# Patient Record
Sex: FEMALE | Race: BLACK OR AFRICAN AMERICAN | Employment: FULL TIME | URBAN - METROPOLITAN AREA
[De-identification: names, ages, dates, MRNs, and addresses within clinical notes are randomized per-mention and may not be internally consistent; named-entity substitution may affect disease eponyms.]

---

## 2018-09-11 ENCOUNTER — OFFICE VISIT (OUTPATIENT)
Dept: INTERNAL MEDICINE CLINIC | Facility: CLINIC | Age: 28
End: 2018-09-11
Payer: COMMERCIAL

## 2018-09-11 ENCOUNTER — APPOINTMENT (OUTPATIENT)
Dept: LAB | Facility: HOSPITAL | Age: 28
End: 2018-09-11
Attending: INTERNAL MEDICINE
Payer: COMMERCIAL

## 2018-09-11 VITALS
BODY MASS INDEX: 39.2 KG/M2 | OXYGEN SATURATION: 100 % | DIASTOLIC BLOOD PRESSURE: 84 MMHG | SYSTOLIC BLOOD PRESSURE: 110 MMHG | RESPIRATION RATE: 17 BRPM | TEMPERATURE: 99 F | HEART RATE: 81 BPM | HEIGHT: 61 IN | WEIGHT: 207.63 LBS

## 2018-09-11 DIAGNOSIS — IMO0001 CLASS 2 OBESITY WITH SERIOUS COMORBIDITY AND BODY MASS INDEX (BMI) OF 39.0 TO 39.9 IN ADULT, UNSPECIFIED OBESITY TYPE: ICD-10-CM

## 2018-09-11 DIAGNOSIS — Z51.81 THERAPEUTIC DRUG MONITORING: ICD-10-CM

## 2018-09-11 DIAGNOSIS — E61.1 IRON DEFICIENCY: ICD-10-CM

## 2018-09-11 DIAGNOSIS — E06.3 HASHIMOTO'S THYROIDITIS: Primary | ICD-10-CM

## 2018-09-11 PROCEDURE — 93010 ELECTROCARDIOGRAM REPORT: CPT | Performed by: INTERNAL MEDICINE

## 2018-09-11 PROCEDURE — 93005 ELECTROCARDIOGRAM TRACING: CPT

## 2018-09-11 PROCEDURE — 99204 OFFICE O/P NEW MOD 45 MIN: CPT | Performed by: INTERNAL MEDICINE

## 2018-09-11 NOTE — PATIENT INSTRUCTIONS
ILYA    Welcome to [facility]. We are excited that you are committed to improving your health and have invited our practice to be part of your journey.  Our approach to the medical management of weight loss is similar to that of other c of water per day, add fiber ( benefiber) to the water to increase fullness, overcome constipation    · Eat slowly    · Do not drink your calories ( no regular pop, juice, high calorie coffee drinks, limit alcohol) Also stay away from artificially sweetened

## 2018-09-11 NOTE — PROGRESS NOTES
Mumtaz Trinidad is a 25year old female. Chief complaint:  weight loss management and obesity related comorbidities    HPI:     Mumtaz Trinidad is a 25year old female new to our office today.     with PMH as listed below here for weight management   has 325 (106 FE) MG OR TABS 3 TAB DAILY Disp:  Rfl:      Past Medical History:   Diagnosis Date   • Anemia    • Asthma    • Hypothyroidism      Past Surgical History:  2012: COLONOSCOPY      Comment:  wnl  Social History:  Social History    Tobacco Use      Sm COMPLETE    (Z51.81) Therapeutic drug monitoring  Plan: EKG 12-LEAD, CANCELED: ELECTROCARDIOGRAM,         COMPLETE    (E61.1) Iron deficiency    Reviewed  Readiness for Lifestyle change: 10/10, Interest in Medication: 10   /10, Surgery interest: 5/10.     C

## 2018-09-12 RX ORDER — PHENTERMINE HYDROCHLORIDE 15 MG/1
15 CAPSULE ORAL EVERY MORNING
Qty: 30 CAPSULE | Refills: 0 | Status: SHIPPED | OUTPATIENT
Start: 2018-09-12 | End: 2020-01-07 | Stop reason: CLARIF

## 2018-09-13 ENCOUNTER — TELEPHONE (OUTPATIENT)
Dept: INTERNAL MEDICINE CLINIC | Facility: CLINIC | Age: 28
End: 2018-09-13

## 2018-10-15 ENCOUNTER — OFFICE VISIT (OUTPATIENT)
Dept: INTERNAL MEDICINE CLINIC | Facility: CLINIC | Age: 28
End: 2018-10-15
Payer: COMMERCIAL

## 2018-10-15 VITALS
WEIGHT: 204.81 LBS | TEMPERATURE: 98 F | OXYGEN SATURATION: 99 % | BODY MASS INDEX: 38.67 KG/M2 | DIASTOLIC BLOOD PRESSURE: 78 MMHG | HEART RATE: 96 BPM | HEIGHT: 61 IN | SYSTOLIC BLOOD PRESSURE: 108 MMHG | RESPIRATION RATE: 17 BRPM

## 2018-10-15 DIAGNOSIS — D64.9 ANEMIA, UNSPECIFIED TYPE: ICD-10-CM

## 2018-10-15 DIAGNOSIS — E03.9 HYPOTHYROIDISM, UNSPECIFIED TYPE: Primary | ICD-10-CM

## 2018-10-15 DIAGNOSIS — E66.9 CLASS 2 OBESITY WITHOUT SERIOUS COMORBIDITY WITH BODY MASS INDEX (BMI) OF 39.0 TO 39.9 IN ADULT, UNSPECIFIED OBESITY TYPE: ICD-10-CM

## 2018-10-15 DIAGNOSIS — Z51.81 THERAPEUTIC DRUG MONITORING: ICD-10-CM

## 2018-10-15 PROBLEM — E66.812 CLASS 2 OBESITY WITHOUT SERIOUS COMORBIDITY WITH BODY MASS INDEX (BMI) OF 39.0 TO 39.9 IN ADULT: Status: ACTIVE | Noted: 2018-10-15

## 2018-10-15 PROCEDURE — 99213 OFFICE O/P EST LOW 20 MIN: CPT | Performed by: INTERNAL MEDICINE

## 2018-10-15 RX ORDER — LEVOTHYROXINE SODIUM 50 UG/1
CAPSULE ORAL
COMMUNITY
Start: 2013-05-01 | End: 2018-10-15

## 2018-10-15 RX ORDER — LEVOTHYROXINE SODIUM 0.05 MG/1
50 TABLET ORAL
COMMUNITY
Start: 2015-03-25 | End: 2018-10-15

## 2018-10-15 RX ORDER — LEVOTHYROXINE SODIUM 0.2 MG/1
TABLET ORAL
Refills: 1 | COMMUNITY
Start: 2018-05-07 | End: 2018-10-15

## 2018-10-15 RX ORDER — LEVOTHYROXINE SODIUM 150 UG/1
CAPSULE ORAL
COMMUNITY
Start: 2013-08-19 | End: 2018-10-15

## 2018-10-15 RX ORDER — LEVOTHYROXINE SODIUM 0.07 MG/1
75 TABLET ORAL
COMMUNITY
Start: 2018-08-13 | End: 2018-10-15

## 2018-10-15 RX ORDER — LEVOTHYROXINE SODIUM 0.2 MG/1
200 TABLET ORAL
COMMUNITY
Start: 2018-08-13 | End: 2018-10-15

## 2018-10-15 RX ORDER — LEVOTHYROXINE SODIUM 300 UG/1
350 TABLET ORAL
COMMUNITY
Start: 2015-03-25 | End: 2018-10-15

## 2018-10-15 RX ORDER — PHENTERMINE HYDROCHLORIDE 15 MG/1
15 CAPSULE ORAL EVERY MORNING
Qty: 30 CAPSULE | Refills: 0 | Status: SHIPPED | OUTPATIENT
Start: 2018-10-15 | End: 2020-01-07

## 2018-10-15 RX ORDER — LEVOTHYROXINE SODIUM 0.07 MG/1
TABLET ORAL
Refills: 1 | COMMUNITY
Start: 2018-05-07 | End: 2018-10-15

## 2018-10-15 NOTE — PROGRESS NOTES
Vera Bell is a 25year old female.     Chief complaint:weight loss follow up , medication refill, anemia and hypothyroidism     HPI:   Baldo Lees here for follow up on weight loss   Wt Readings from Last 12 Encounters:  10/15/18 : 204 lb 12.8 oz  09/11/1 hypothyroidism              REVIEW OF SYSTEMS:   A comprehensive 10 point review of systems was completed.   Pertinent positives and negatives noted in the the HPI          PHYSICAL EXAM:   /78 (BP Location: Right arm, Patient Position: Sitting, Cuff D, 25-HYDROXY,         VITAMIN B12, FERRITIN, HEMOGLOBIN A1C, LIPID         PANEL, TSH W REFLEX TO FREE T4    (Z51.81) Therapeutic drug monitoring  Plan: CBC WITH DIFFERENTIAL WITH PLATELET, COMP         METABOLIC PANEL (14), VITAMIN D, 25-HYDROXY,

## 2018-10-15 NOTE — PATIENT INSTRUCTIONS
Healthy Tips for Eating Out  Choices  It’s not easy to change the habits of a lifetime. Experts say that it can take 6 months just to change one old habit for a healthier one. That’s why gradual change is so important.  These tips are reminders of the dozen the skin from fried chicken and choose mashed potatoes, corn on the cob, and baked beans on the side. · Order a broiled chicken salad and pile on fresh vegetables from the salad bar. Use a small amount of low-fat dressing.   · Top a baked potato with cotta foods made with butter, coconut or palm oil, or hydrogenated fats    © 1218-6239 The [facility] [employer], [facility] [employer]. All rights reserved. This information is not intended as a substitute for professional medical care.  Trevor Cisneros

## 2018-10-27 ENCOUNTER — LAB ENCOUNTER (OUTPATIENT)
Dept: LAB | Age: 28
End: 2018-10-27
Attending: INTERNAL MEDICINE
Payer: COMMERCIAL

## 2018-10-27 DIAGNOSIS — E03.9 HYPOTHYROIDISM, UNSPECIFIED TYPE: ICD-10-CM

## 2018-10-27 DIAGNOSIS — Z51.81 THERAPEUTIC DRUG MONITORING: ICD-10-CM

## 2018-10-27 PROCEDURE — 80061 LIPID PANEL: CPT

## 2018-10-27 PROCEDURE — 80053 COMPREHEN METABOLIC PANEL: CPT

## 2018-10-27 PROCEDURE — 83036 HEMOGLOBIN GLYCOSYLATED A1C: CPT

## 2018-10-27 PROCEDURE — 84443 ASSAY THYROID STIM HORMONE: CPT

## 2018-10-27 PROCEDURE — 36415 COLL VENOUS BLD VENIPUNCTURE: CPT

## 2018-10-27 PROCEDURE — 82306 VITAMIN D 25 HYDROXY: CPT

## 2018-10-27 PROCEDURE — 82607 VITAMIN B-12: CPT

## 2018-10-27 PROCEDURE — 85025 COMPLETE CBC W/AUTO DIFF WBC: CPT

## 2018-10-27 PROCEDURE — 82728 ASSAY OF FERRITIN: CPT

## 2018-10-30 RX ORDER — ERGOCALCIFEROL 1.25 MG/1
50000 CAPSULE ORAL WEEKLY
Qty: 12 CAPSULE | Refills: 1 | Status: SHIPPED | OUTPATIENT
Start: 2018-10-30 | End: 2019-01-16

## 2018-10-30 RX ORDER — LEVOTHYROXINE SODIUM 88 UG/1
88 TABLET ORAL
Qty: 90 TABLET | Refills: 1 | Status: SHIPPED | OUTPATIENT
Start: 2018-10-30 | End: 2019-01-28

## 2018-11-02 ENCOUNTER — CHARTING TRANS (OUTPATIENT)
Dept: OTHER | Age: 28
End: 2018-11-02

## 2018-12-10 ENCOUNTER — OFFICE VISIT (OUTPATIENT)
Dept: INTERNAL MEDICINE CLINIC | Facility: CLINIC | Age: 28
End: 2018-12-10
Payer: COMMERCIAL

## 2018-12-10 VITALS
WEIGHT: 205.38 LBS | TEMPERATURE: 98 F | HEART RATE: 86 BPM | OXYGEN SATURATION: 98 % | RESPIRATION RATE: 17 BRPM | HEIGHT: 61 IN | BODY MASS INDEX: 38.78 KG/M2 | DIASTOLIC BLOOD PRESSURE: 70 MMHG | SYSTOLIC BLOOD PRESSURE: 122 MMHG

## 2018-12-10 DIAGNOSIS — E78.5 HYPERLIPIDEMIA, UNSPECIFIED HYPERLIPIDEMIA TYPE: ICD-10-CM

## 2018-12-10 DIAGNOSIS — E66.9 CLASS 2 OBESITY WITHOUT SERIOUS COMORBIDITY WITH BODY MASS INDEX (BMI) OF 39.0 TO 39.9 IN ADULT, UNSPECIFIED OBESITY TYPE: ICD-10-CM

## 2018-12-10 DIAGNOSIS — E03.9 HYPOTHYROIDISM, UNSPECIFIED TYPE: ICD-10-CM

## 2018-12-10 DIAGNOSIS — Z51.81 THERAPEUTIC DRUG MONITORING: Primary | ICD-10-CM

## 2018-12-10 DIAGNOSIS — E55.9 VITAMIN D DEFICIENCY: ICD-10-CM

## 2018-12-10 PROCEDURE — 99214 OFFICE O/P EST MOD 30 MIN: CPT | Performed by: INTERNAL MEDICINE

## 2018-12-10 RX ORDER — PHENTERMINE HYDROCHLORIDE 37.5 MG/1
37.5 TABLET ORAL
Qty: 30 TABLET | Refills: 0 | Status: SHIPPED | OUTPATIENT
Start: 2018-12-10 | End: 2019-01-09

## 2018-12-10 RX ORDER — TOPIRAMATE 25 MG/1
25 TABLET ORAL 2 TIMES DAILY
Qty: 60 TABLET | Refills: 0 | Status: SHIPPED | OUTPATIENT
Start: 2018-12-10 | End: 2019-02-08

## 2018-12-10 NOTE — PROGRESS NOTES
Sandy William is a 25year old female.     Chief complaint:weight loss follow up , medication refill,     HPI:   ILYA here for follow up on weight loss   Wt Readings from Last 12 Encounters:  12/10/18 : 205 lb 6.4 oz  10/15/18 : 204 lb 12.8 oz  09/11/1 SOLR 50 MCG DAILY Disp:  Rfl:    FERROUS FUMARATE 325 (106 FE) MG OR TABS 3 TAB DAILY Disp:  Rfl:       Past Medical History:   Diagnosis Date   • Anemia    • Asthma    • Hypothyroidism      Past Surgical History:   Procedure Laterality Date   • COLONOSCOP obesity type    (E03.9) Hypothyroidism, unspecified type    (E55.9) Vitamin D deficiency    Plan:  · Patient has gained 1 lbs # since LOV 2 month ago. Patient has lost a total weight loss of  2 lbs # since first weight loss consult.   Labs reviewed  · Anita Gentile

## 2018-12-28 ENCOUNTER — HOSPITAL ENCOUNTER (OUTPATIENT)
Age: 28
Discharge: HOME | End: 2018-12-28
Attending: FAMILY MEDICINE
Payer: COMMERCIAL

## 2018-12-28 VITALS
HEART RATE: 108 BPM | DIASTOLIC BLOOD PRESSURE: 78 MMHG | RESPIRATION RATE: 22 BRPM | SYSTOLIC BLOOD PRESSURE: 115 MMHG | TEMPERATURE: 98 F | OXYGEN SATURATION: 100 %

## 2018-12-28 DIAGNOSIS — J45.21 MILD INTERMITTENT ASTHMA WITH EXACERBATION: Primary | ICD-10-CM

## 2018-12-28 DIAGNOSIS — R06.02 SHORTNESS OF BREATH: ICD-10-CM

## 2018-12-28 PROCEDURE — 94640 AIRWAY INHALATION TREATMENT: CPT

## 2018-12-28 PROCEDURE — 99204 OFFICE O/P NEW MOD 45 MIN: CPT

## 2018-12-28 PROCEDURE — 99214 OFFICE O/P EST MOD 30 MIN: CPT

## 2018-12-28 RX ORDER — IPRATROPIUM BROMIDE AND ALBUTEROL SULFATE 2.5; .5 MG/3ML; MG/3ML
3 SOLUTION RESPIRATORY (INHALATION) ONCE
Status: COMPLETED | OUTPATIENT
Start: 2018-12-28 | End: 2018-12-28

## 2018-12-28 RX ORDER — PREDNISONE 20 MG/1
TABLET ORAL
Qty: 8 TABLET | Refills: 0 | Status: SHIPPED | OUTPATIENT
Start: 2018-12-28 | End: 2020-01-07

## 2018-12-28 RX ORDER — ALBUTEROL SULFATE 2.5 MG/3ML
2.5 SOLUTION RESPIRATORY (INHALATION) EVERY 4 HOURS PRN
Qty: 1 BOX | Refills: 0 | Status: SHIPPED | OUTPATIENT
Start: 2018-12-28

## 2018-12-28 RX ORDER — PREDNISONE 20 MG/1
40 TABLET ORAL ONCE
Status: COMPLETED | OUTPATIENT
Start: 2018-12-28 | End: 2018-12-28

## 2018-12-28 NOTE — ED PROVIDER NOTES
Patient Seen in: [facility]    History   Patient presents with:  Cough/URI    Stated Complaint: difficulty breathing,asthma    HPI    This 24-year-old female with a known history for asthma presents the office with complaint of Sclera anicteric,  conjunctiva normal.  EARS: Tympanic membranes normal, EAC's normal.  NOSE: Turbinates congested, no bleeding noted. PHARYNX:  Cobblestoning with post nasal drip noted, airway patent, uvula midline  NECK:  No cervical lymphadenopathy.  No once daily with breakfast 4 days. Start on 12/29/18. Qty: 8 tablet Refills: 0    albuterol sulfate (2.5 MG/3ML) 0.083% Inhalation Nebu Soln  Take 3 mL (2.5 mg total) by nebulization every 4 (four) hours as needed for Wheezing or Shortness of Breath.   Qty:

## 2019-01-07 ENCOUNTER — OFFICE VISIT (OUTPATIENT)
Dept: INTERNAL MEDICINE CLINIC | Facility: CLINIC | Age: 29
End: 2019-01-07
Payer: COMMERCIAL

## 2019-01-07 VITALS
BODY MASS INDEX: 37.42 KG/M2 | HEART RATE: 91 BPM | DIASTOLIC BLOOD PRESSURE: 80 MMHG | RESPIRATION RATE: 17 BRPM | OXYGEN SATURATION: 98 % | WEIGHT: 198.19 LBS | SYSTOLIC BLOOD PRESSURE: 122 MMHG | TEMPERATURE: 99 F | HEIGHT: 61 IN

## 2019-01-07 DIAGNOSIS — Z51.81 THERAPEUTIC DRUG MONITORING: Primary | ICD-10-CM

## 2019-01-07 DIAGNOSIS — E66.9 CLASS 2 OBESITY WITHOUT SERIOUS COMORBIDITY WITH BODY MASS INDEX (BMI) OF 39.0 TO 39.9 IN ADULT, UNSPECIFIED OBESITY TYPE: ICD-10-CM

## 2019-01-07 DIAGNOSIS — E03.9 HYPOTHYROIDISM, UNSPECIFIED TYPE: ICD-10-CM

## 2019-01-07 DIAGNOSIS — Z76.0 MEDICATION REFILL: ICD-10-CM

## 2019-01-07 PROCEDURE — 99213 OFFICE O/P EST LOW 20 MIN: CPT | Performed by: INTERNAL MEDICINE

## 2019-01-07 RX ORDER — TOPIRAMATE 25 MG/1
25 TABLET ORAL 2 TIMES DAILY
Qty: 60 TABLET | Refills: 1 | Status: SHIPPED | OUTPATIENT
Start: 2019-01-07 | End: 2019-02-06

## 2019-01-07 RX ORDER — PHENTERMINE HYDROCHLORIDE 37.5 MG/1
37.5 TABLET ORAL
Qty: 30 TABLET | Refills: 0 | Status: SHIPPED | OUTPATIENT
Start: 2019-01-07 | End: 2019-02-06

## 2019-01-07 RX ORDER — PHENTERMINE HYDROCHLORIDE 37.5 MG/1
37.5 TABLET ORAL
Qty: 45 TABLET | Refills: 0 | Status: SHIPPED | OUTPATIENT
Start: 2019-01-07 | End: 2019-02-20

## 2019-01-07 NOTE — PROGRESS NOTES
Libia Milton is a 25year old female.     Chief complaint:weight loss follow up , medication refill    HPI:   Vikas Angela here for follow up on weight loss   Wt Readings from Last 12 Encounters:  01/07/19 : 198 lb 3.2 oz  12/10/18 : 205 lb 6.4 oz  10/15/18 : mouth once a week for 12 doses.  Disp: 12 capsule Rfl: 1   Levothyroxine Sodium 88 MCG Oral Tab Take 1 tablet (88 mcg total) by mouth before breakfast. Disp: 90 tablet Rfl: 1   Phentermine HCl 15 MG Oral Cap Take 1 capsule (15 mg total) by mouth every morni nourished,in no apparent distress  SKIN: no rashes,no suspicious lesions  HEENT: atraumatic, normocephalic,ears and throat are clear  NECK: supple,no adenopathy  LUNGS: clear to auscultation  CARDIO: RRR without murmur  GI: good BS's,no masses, HSM or tend

## 2019-01-07 NOTE — PATIENT INSTRUCTIONS
A Sample Walking Program  Experts recommend walking briskly on most days. Aim for a target of 30 minutes on most days, or 150 or more minutes a week.  Walking programs can help you reach this goal by slowly increasing the frequency and the amount of  time y

## 2019-02-20 DIAGNOSIS — Z51.81 THERAPEUTIC DRUG MONITORING: ICD-10-CM

## 2019-02-20 DIAGNOSIS — Z76.0 MEDICATION REFILL: ICD-10-CM

## 2019-02-20 DIAGNOSIS — E66.9 CLASS 2 OBESITY WITHOUT SERIOUS COMORBIDITY WITH BODY MASS INDEX (BMI) OF 39.0 TO 39.9 IN ADULT, UNSPECIFIED OBESITY TYPE: ICD-10-CM

## 2019-02-20 DIAGNOSIS — E03.9 HYPOTHYROIDISM, UNSPECIFIED TYPE: ICD-10-CM

## 2019-02-20 RX ORDER — PHENTERMINE HYDROCHLORIDE 37.5 MG/1
37.5 TABLET ORAL
Qty: 45 TABLET | Refills: 0 | Status: SHIPPED | OUTPATIENT
Start: 2019-02-20 | End: 2019-04-06

## 2019-02-25 ENCOUNTER — OFFICE VISIT (OUTPATIENT)
Dept: INTERNAL MEDICINE CLINIC | Facility: CLINIC | Age: 29
End: 2019-02-25
Payer: COMMERCIAL

## 2019-02-25 VITALS
HEIGHT: 61 IN | WEIGHT: 196.19 LBS | RESPIRATION RATE: 18 BRPM | DIASTOLIC BLOOD PRESSURE: 64 MMHG | BODY MASS INDEX: 37.04 KG/M2 | HEART RATE: 88 BPM | SYSTOLIC BLOOD PRESSURE: 118 MMHG | OXYGEN SATURATION: 99 % | TEMPERATURE: 97 F

## 2019-02-25 DIAGNOSIS — Z51.81 THERAPEUTIC DRUG MONITORING: ICD-10-CM

## 2019-02-25 DIAGNOSIS — E03.9 HYPOTHYROIDISM, UNSPECIFIED TYPE: ICD-10-CM

## 2019-02-25 DIAGNOSIS — E78.5 HYPERLIPIDEMIA, UNSPECIFIED HYPERLIPIDEMIA TYPE: Primary | ICD-10-CM

## 2019-02-25 DIAGNOSIS — E66.9 CLASS 2 OBESITY WITHOUT SERIOUS COMORBIDITY WITH BODY MASS INDEX (BMI) OF 39.0 TO 39.9 IN ADULT, UNSPECIFIED OBESITY TYPE: ICD-10-CM

## 2019-02-25 PROCEDURE — 99214 OFFICE O/P EST MOD 30 MIN: CPT | Performed by: INTERNAL MEDICINE

## 2019-02-25 RX ORDER — PHENTERMINE HYDROCHLORIDE 37.5 MG/1
37.5 TABLET ORAL
Qty: 30 TABLET | Refills: 0 | Status: SHIPPED | OUTPATIENT
Start: 2019-02-25 | End: 2019-03-27

## 2019-02-25 RX ORDER — TOPIRAMATE 50 MG/1
50 TABLET, FILM COATED ORAL 2 TIMES DAILY
Qty: 180 TABLET | Refills: 0 | Status: SHIPPED | OUTPATIENT
Start: 2019-02-25 | End: 2019-05-26

## 2019-02-25 NOTE — PATIENT INSTRUCTIONS
Lorcaserin extended-release tablets  Brand Name: Belviq XR  What is this medicine? LORCASERIN (jennifer ca SER in) is used to promote and maintain weight loss in obese patients.  This medicine should be used with a reduced calorie diet and, if appropriate, an Side effects that usually do not require medical attention (report to your doctor or health care professional if they continue or are bothersome):  · back pain  · constipation  · cough  · dry mouth  · nausea  · tiredness  What may interact with this medici · pregnant or trying to get pregnant  · breast-feeding  What should I watch for while using this medicine? This medicine is intended to be used in addition to a healthy diet and appropriate exercise. The best results are achieved this way.  Your doctor medardo

## 2019-03-29 ENCOUNTER — DOCUMENTATION ONLY (OUTPATIENT)
Dept: INTERNAL MEDICINE CLINIC | Facility: CLINIC | Age: 29
End: 2019-03-29

## 2020-01-07 ENCOUNTER — HOSPITAL ENCOUNTER (OUTPATIENT)
Facility: HOSPITAL | Age: 30
Setting detail: OBSERVATION
Discharge: HOME | End: 2020-01-09
Attending: EMERGENCY MEDICINE | Admitting: HOSPITALIST
Payer: COMMERCIAL

## 2020-01-07 ENCOUNTER — APPOINTMENT (OUTPATIENT)
Dept: ULTRASOUND IMAGING | Age: 30
End: 2020-01-07
Attending: EMERGENCY MEDICINE
Payer: COMMERCIAL

## 2020-01-07 ENCOUNTER — HOSPITAL ENCOUNTER (OUTPATIENT)
Age: 30
Discharge: ANOTHER HOSPITAL | End: 2020-01-07
Attending: EMERGENCY MEDICINE
Payer: COMMERCIAL

## 2020-01-07 VITALS
HEART RATE: 89 BPM | OXYGEN SATURATION: 100 % | RESPIRATION RATE: 18 BRPM | TEMPERATURE: 99 F | DIASTOLIC BLOOD PRESSURE: 85 MMHG | WEIGHT: 190 LBS | HEIGHT: 61 IN | BODY MASS INDEX: 35.87 KG/M2 | SYSTOLIC BLOOD PRESSURE: 123 MMHG

## 2020-01-07 DIAGNOSIS — K80.20 GALLSTONES: Primary | ICD-10-CM

## 2020-01-07 DIAGNOSIS — K80.50 BILIARY COLIC: ICD-10-CM

## 2020-01-07 DIAGNOSIS — K80.50 GALL STONES, COMMON BILE DUCT: ICD-10-CM

## 2020-01-07 DIAGNOSIS — K80.20 CHOLELITHIASES: ICD-10-CM

## 2020-01-07 PROBLEM — E66.9 CLASS 2 OBESITY WITHOUT SERIOUS COMORBIDITY WITH BODY MASS INDEX (BMI) OF 35.0 TO 35.9 IN ADULT: Status: ACTIVE | Noted: 2018-10-15

## 2020-01-07 PROBLEM — E66.812 CLASS 2 OBESITY WITHOUT SERIOUS COMORBIDITY WITH BODY MASS INDEX (BMI) OF 35.0 TO 35.9 IN ADULT: Status: ACTIVE | Noted: 2018-10-15

## 2020-01-07 LAB
#MXD IC: 0.5 X10ˆ3/UL (ref 0.1–1)
ALBUMIN SERPL-MCNC: 3.6 G/DL (ref 3.4–5)
ALBUMIN/GLOB SERPL: 0.7 {RATIO} (ref 1–2)
ALP LIVER SERPL-CCNC: 201 U/L (ref 37–98)
ALT SERPL-CCNC: 570 U/L (ref 13–56)
ANION GAP SERPL CALC-SCNC: 6 MMOL/L (ref 0–18)
AST SERPL-CCNC: 366 U/L (ref 15–37)
BASOPHILS # BLD AUTO: 0.08 X10(3) UL (ref 0–0.2)
BASOPHILS NFR BLD AUTO: 1.2 %
BILIRUB SERPL-MCNC: 3.3 MG/DL (ref 0.1–2)
BUN BLD-MCNC: 7 MG/DL (ref 7–18)
BUN/CREAT SERPL: 5.3 (ref 10–20)
CALCIUM BLD-MCNC: 9.6 MG/DL (ref 8.5–10.1)
CHLORIDE SERPL-SCNC: 107 MMOL/L (ref 98–112)
CLARITY UR REFRACT.AUTO: CLEAR
CO2 SERPL-SCNC: 23 MMOL/L (ref 21–32)
CREAT BLD-MCNC: 1.2 MG/DL (ref 0.55–1.02)
CREAT BLD-MCNC: 1.31 MG/DL (ref 0.55–1.02)
DEPRECATED RDW RBC AUTO: 40.9 FL (ref 35.1–46.3)
EOSINOPHIL # BLD AUTO: 0.02 X10(3) UL (ref 0–0.7)
EOSINOPHIL NFR BLD AUTO: 0.3 %
ERYTHROCYTE [DISTWIDTH] IN BLOOD BY AUTOMATED COUNT: 13.8 % (ref 11–15)
GLOBULIN PLAS-MCNC: 5.4 G/DL (ref 2.8–4.4)
GLUCOSE BLD-MCNC: 103 MG/DL (ref 70–99)
GLUCOSE BLD-MCNC: 87 MG/DL (ref 70–99)
GLUCOSE UR STRIP.AUTO-MCNC: NEGATIVE MG/DL
HCT VFR BLD AUTO: 39.7 % (ref 35–48)
HCT VFR BLD AUTO: 40 % (ref 35–48)
HGB BLD-MCNC: 12.4 G/DL (ref 12–16)
HGB BLD-MCNC: 12.6 G/DL (ref 12–16)
IMM GRANULOCYTES # BLD AUTO: 0.02 X10(3) UL (ref 0–1)
IMM GRANULOCYTES NFR BLD: 0.3 %
ISTAT BUN: 7 MG/DL (ref 8–20)
ISTAT CHLORIDE: 104 MMOL/L (ref 101–111)
ISTAT HEMATOCRIT: 43 % (ref 34–50)
ISTAT IONIZED CALCIUM FOR CHEM 8: 1.22 MMOL/L (ref 1.12–1.32)
ISTAT POTASSIUM: 4.9 MMOL/L (ref 3.6–5.1)
ISTAT SODIUM: 137 MMOL/L (ref 136–145)
KETONES UR STRIP.AUTO-MCNC: NEGATIVE MG/DL
LEUKOCYTE ESTERASE UR QL STRIP.AUTO: NEGATIVE
LIPASE SERPL-CCNC: 128 U/L (ref 73–393)
LYMPHOCYTES # BLD AUTO: 1.4 X10ˆ3/UL (ref 1–4)
LYMPHOCYTES # BLD AUTO: 1.92 X10(3) UL (ref 1–4)
LYMPHOCYTES NFR BLD AUTO: 21.2 %
LYMPHOCYTES NFR BLD AUTO: 28.4 %
M PROTEIN MFR SERPL ELPH: 9 G/DL (ref 6.4–8.2)
MCH RBC QN AUTO: 25.5 PG (ref 26–34)
MCH RBC QN AUTO: 25.9 PG (ref 26–34)
MCHC RBC AUTO-ENTMCNC: 31.2 G/DL (ref 31–37)
MCHC RBC AUTO-ENTMCNC: 31.5 G/DL (ref 31–37)
MCV RBC AUTO: 81.5 FL (ref 80–100)
MCV RBC AUTO: 82.3 FL (ref 80–100)
MIXED CELL %: 7.8 %
MONOCYTES # BLD AUTO: 0.55 X10(3) UL (ref 0.1–1)
MONOCYTES NFR BLD AUTO: 8.1 %
NEUTROPHILS # BLD AUTO: 4.17 X10 (3) UL (ref 1.5–7.7)
NEUTROPHILS # BLD AUTO: 4.17 X10(3) UL (ref 1.5–7.7)
NEUTROPHILS # BLD AUTO: 4.9 X10ˆ3/UL (ref 1.5–7.7)
NEUTROPHILS NFR BLD AUTO: 61.7 %
NEUTROPHILS NFR BLD AUTO: 71 %
NITRITE UR QL STRIP.AUTO: NEGATIVE
OSMOLALITY SERPL CALC.SUM OF ELEC: 279 MOSM/KG (ref 275–295)
PH UR STRIP.AUTO: 5 [PH] (ref 4.5–8)
PLATELET # BLD AUTO: 361 10(3)UL (ref 150–450)
PLATELET # BLD AUTO: 377 X10ˆ3/UL (ref 150–450)
POCT GLUCOSE URINE: NEGATIVE MG/DL
POCT LEUKOCYTE ESTERASE URINE: NEGATIVE
POCT NITRITE URINE: NEGATIVE
POCT PH URINE: 5 (ref 5–8)
POCT PROTEIN URINE: NEGATIVE MG/DL
POCT SPECIFIC GRAVITY URINE: 1.03
POCT URINE CLARITY: CLEAR
POCT URINE PREGNANCY: NEGATIVE
POCT URINE PREGNANCY: NEGATIVE
POCT UROBILINOGEN URINE: 1 MG/DL
POTASSIUM SERPL-SCNC: 4.7 MMOL/L (ref 3.5–5.1)
PROT UR STRIP.AUTO-MCNC: NEGATIVE MG/DL
RBC # BLD AUTO: 4.86 X10ˆ6/UL (ref 3.8–5.3)
RBC # BLD AUTO: 4.87 X10(6)UL (ref 3.8–5.3)
RBC UR QL AUTO: NEGATIVE
SODIUM SERPL-SCNC: 136 MMOL/L (ref 136–145)
SP GR UR STRIP.AUTO: 1.02 (ref 1–1.03)
UROBILINOGEN UR STRIP.AUTO-MCNC: 4 MG/DL
WBC # BLD AUTO: 6.8 X10(3) UL (ref 4–11)
WBC # BLD AUTO: 6.8 X10ˆ3/UL (ref 4–11)

## 2020-01-07 PROCEDURE — 99220 INITIAL OBSERVATION CARE,LEVL III: CPT | Performed by: HOSPITALIST

## 2020-01-07 PROCEDURE — 81025 URINE PREGNANCY TEST: CPT | Performed by: EMERGENCY MEDICINE

## 2020-01-07 PROCEDURE — 36415 COLL VENOUS BLD VENIPUNCTURE: CPT

## 2020-01-07 PROCEDURE — 76700 US EXAM ABDOM COMPLETE: CPT | Performed by: EMERGENCY MEDICINE

## 2020-01-07 PROCEDURE — 81002 URINALYSIS NONAUTO W/O SCOPE: CPT | Performed by: EMERGENCY MEDICINE

## 2020-01-07 PROCEDURE — 99214 OFFICE O/P EST MOD 30 MIN: CPT

## 2020-01-07 PROCEDURE — 85025 COMPLETE CBC W/AUTO DIFF WBC: CPT | Performed by: EMERGENCY MEDICINE

## 2020-01-07 PROCEDURE — 80047 BASIC METABLC PNL IONIZED CA: CPT

## 2020-01-07 RX ORDER — HYDROMORPHONE HYDROCHLORIDE 1 MG/ML
0.5 INJECTION, SOLUTION INTRAMUSCULAR; INTRAVENOUS; SUBCUTANEOUS EVERY 30 MIN PRN
Status: DISCONTINUED | OUTPATIENT
Start: 2020-01-07 | End: 2020-01-07

## 2020-01-07 RX ORDER — NALTREXONE HYDROCHLORIDE AND BUPROPION HYDROCHLORIDE 8; 90 MG/1; MG/1
1 TABLET, EXTENDED RELEASE ORAL DAILY
COMMUNITY
End: 2022-02-04

## 2020-01-07 RX ORDER — MORPHINE SULFATE 4 MG/ML
2 INJECTION, SOLUTION INTRAMUSCULAR; INTRAVENOUS EVERY 2 HOUR PRN
Status: DISCONTINUED | OUTPATIENT
Start: 2020-01-07 | End: 2020-01-09

## 2020-01-07 RX ORDER — SODIUM CHLORIDE 9 MG/ML
INJECTION, SOLUTION INTRAVENOUS CONTINUOUS
Status: DISCONTINUED | OUTPATIENT
Start: 2020-01-07 | End: 2020-01-09

## 2020-01-07 RX ORDER — ONDANSETRON 2 MG/ML
4 INJECTION INTRAMUSCULAR; INTRAVENOUS EVERY 6 HOURS PRN
Status: DISCONTINUED | OUTPATIENT
Start: 2020-01-07 | End: 2020-01-07

## 2020-01-07 RX ORDER — LEVOTHYROXINE SODIUM 0.05 MG/1
50 TABLET ORAL
COMMUNITY
Start: 2019-12-31 | End: 2022-02-04

## 2020-01-07 RX ORDER — ONDANSETRON 2 MG/ML
4 INJECTION INTRAMUSCULAR; INTRAVENOUS EVERY 4 HOURS PRN
Status: DISCONTINUED | OUTPATIENT
Start: 2020-01-07 | End: 2020-01-07

## 2020-01-07 RX ORDER — ONDANSETRON 2 MG/ML
4 INJECTION INTRAMUSCULAR; INTRAVENOUS EVERY 6 HOURS PRN
Status: DISCONTINUED | OUTPATIENT
Start: 2020-01-07 | End: 2020-01-09

## 2020-01-07 RX ORDER — MELATONIN
325
COMMUNITY
End: 2022-02-04

## 2020-01-07 RX ORDER — MORPHINE SULFATE 4 MG/ML
1 INJECTION, SOLUTION INTRAMUSCULAR; INTRAVENOUS EVERY 2 HOUR PRN
Status: DISCONTINUED | OUTPATIENT
Start: 2020-01-07 | End: 2020-01-09

## 2020-01-07 RX ORDER — LEVOTHYROXINE SODIUM 300 UG/1
300 TABLET ORAL
COMMUNITY
Start: 2019-12-31 | End: 2022-02-04

## 2020-01-07 RX ORDER — MORPHINE SULFATE 4 MG/ML
4 INJECTION, SOLUTION INTRAMUSCULAR; INTRAVENOUS EVERY 2 HOUR PRN
Status: DISCONTINUED | OUTPATIENT
Start: 2020-01-07 | End: 2020-01-09

## 2020-01-07 RX ORDER — METOCLOPRAMIDE HYDROCHLORIDE 5 MG/ML
10 INJECTION INTRAMUSCULAR; INTRAVENOUS EVERY 8 HOURS PRN
Status: DISCONTINUED | OUTPATIENT
Start: 2020-01-07 | End: 2020-01-09

## 2020-01-07 RX ORDER — KETOROLAC TROMETHAMINE 30 MG/ML
15 INJECTION, SOLUTION INTRAMUSCULAR; INTRAVENOUS ONCE
Status: DISCONTINUED | OUTPATIENT
Start: 2020-01-07 | End: 2020-01-07

## 2020-01-07 RX ORDER — SODIUM CHLORIDE 9 MG/ML
INJECTION, SOLUTION INTRAVENOUS CONTINUOUS
Status: DISCONTINUED | OUTPATIENT
Start: 2020-01-07 | End: 2020-01-08

## 2020-01-07 NOTE — ED PROVIDER NOTES
Patient Seen in: [address] [facility]      History   Patient presents with:  Abdominal Pain    Stated Complaint: upper abd pain    HPI    Patient is very pleasant 23-year-old female presents with epigastric pain for last 2 days.   With dianne clear to auscultation bilaterally without rales/rhonchi. Equal breath sounds bilaterally  Cardiac: No tachycardia. No murmurs. Regular rate and rhythm. Abdomen: Mild tenderness in the epigastrium. No lower abdominal tenderness. Negative Connor's.   No [facility]  [phone]    In 2 days          Medications Prescribed:  Discharge Medication List as of 1/7/2020  5:37 PM

## 2020-01-08 ENCOUNTER — APPOINTMENT (OUTPATIENT)
Dept: GENERAL RADIOLOGY | Facility: HOSPITAL | Age: 30
End: 2020-01-08
Attending: SURGERY
Payer: COMMERCIAL

## 2020-01-08 ENCOUNTER — ANESTHESIA (OUTPATIENT)
Dept: SURGERY | Facility: HOSPITAL | Age: 30
End: 2020-01-08
Payer: COMMERCIAL

## 2020-01-08 ENCOUNTER — ANESTHESIA EVENT (OUTPATIENT)
Dept: SURGERY | Facility: HOSPITAL | Age: 30
End: 2020-01-08
Payer: COMMERCIAL

## 2020-01-08 LAB
ALBUMIN SERPL-MCNC: 2.9 G/DL (ref 3.4–5)
ALBUMIN/GLOB SERPL: 0.7 {RATIO} (ref 1–2)
ALP LIVER SERPL-CCNC: 164 U/L (ref 37–98)
ALT SERPL-CCNC: 394 U/L (ref 13–56)
ANION GAP SERPL CALC-SCNC: 5 MMOL/L (ref 0–18)
AST SERPL-CCNC: 180 U/L (ref 15–37)
BILIRUB SERPL-MCNC: 1.5 MG/DL (ref 0.1–2)
BUN BLD-MCNC: 7 MG/DL (ref 7–18)
BUN/CREAT SERPL: 6.2 (ref 10–20)
CALCIUM BLD-MCNC: 8.7 MG/DL (ref 8.5–10.1)
CHLORIDE SERPL-SCNC: 111 MMOL/L (ref 98–112)
CO2 SERPL-SCNC: 23 MMOL/L (ref 21–32)
CREAT BLD-MCNC: 1.13 MG/DL (ref 0.55–1.02)
GLOBULIN PLAS-MCNC: 3.9 G/DL (ref 2.8–4.4)
GLUCOSE BLD-MCNC: 81 MG/DL (ref 70–99)
INR BLD: 1.14 (ref 0.9–1.1)
M PROTEIN MFR SERPL ELPH: 6.8 G/DL (ref 6.4–8.2)
OSMOLALITY SERPL CALC.SUM OF ELEC: 285 MOSM/KG (ref 275–295)
POTASSIUM SERPL-SCNC: 4 MMOL/L (ref 3.5–5.1)
PSA SERPL DL<=0.01 NG/ML-MCNC: 15.1 SECONDS (ref 12.5–14.7)
SODIUM SERPL-SCNC: 139 MMOL/L (ref 136–145)

## 2020-01-08 PROCEDURE — 0FT44ZZ RESECTION OF GALLBLADDER, PERCUTANEOUS ENDOSCOPIC APPROACH: ICD-10-PCS | Performed by: SURGERY

## 2020-01-08 PROCEDURE — BF121ZZ FLUOROSCOPY OF GALLBLADDER USING LOW OSMOLAR CONTRAST: ICD-10-PCS | Performed by: SURGERY

## 2020-01-08 PROCEDURE — 74300 X-RAY BILE DUCTS/PANCREAS: CPT | Performed by: SURGERY

## 2020-01-08 PROCEDURE — 99225 SUBSEQUENT OBSERVATION CARE: CPT | Performed by: HOSPITALIST

## 2020-01-08 PROCEDURE — 47563 LAPARO CHOLECYSTECTOMY/GRAPH: CPT | Performed by: SURGERY

## 2020-01-08 PROCEDURE — 99243 OFF/OP CNSLTJ NEW/EST LOW 30: CPT | Performed by: SURGERY

## 2020-01-08 PROCEDURE — 47563 LAPARO CHOLECYSTECTOMY/GRAPH: CPT | Performed by: PHYSICIAN ASSISTANT

## 2020-01-08 RX ORDER — HEPARIN SODIUM 5000 [USP'U]/ML
5000 INJECTION, SOLUTION INTRAVENOUS; SUBCUTANEOUS ONCE
Status: DISCONTINUED | OUTPATIENT
Start: 2020-01-08 | End: 2020-01-08 | Stop reason: HOSPADM

## 2020-01-08 RX ORDER — HYDROCODONE BITARTRATE AND ACETAMINOPHEN 5; 325 MG/1; MG/1
1 TABLET ORAL EVERY 6 HOURS PRN
Status: DISCONTINUED | OUTPATIENT
Start: 2020-01-08 | End: 2020-01-09

## 2020-01-08 RX ORDER — SODIUM CHLORIDE, SODIUM LACTATE, POTASSIUM CHLORIDE, CALCIUM CHLORIDE 600; 310; 30; 20 MG/100ML; MG/100ML; MG/100ML; MG/100ML
INJECTION, SOLUTION INTRAVENOUS CONTINUOUS PRN
Status: DISCONTINUED | OUTPATIENT
Start: 2020-01-08 | End: 2020-01-08 | Stop reason: SURG

## 2020-01-08 RX ORDER — SODIUM CHLORIDE, SODIUM LACTATE, POTASSIUM CHLORIDE, CALCIUM CHLORIDE 600; 310; 30; 20 MG/100ML; MG/100ML; MG/100ML; MG/100ML
INJECTION, SOLUTION INTRAVENOUS CONTINUOUS
Status: DISCONTINUED | OUTPATIENT
Start: 2020-01-08 | End: 2020-01-08 | Stop reason: HOSPADM

## 2020-01-08 RX ORDER — BUPIVACAINE HYDROCHLORIDE 5 MG/ML
INJECTION, SOLUTION EPIDURAL; INTRACAUDAL AS NEEDED
Status: DISCONTINUED | OUTPATIENT
Start: 2020-01-08 | End: 2020-01-08

## 2020-01-08 RX ORDER — MIDAZOLAM HYDROCHLORIDE 1 MG/ML
1 INJECTION INTRAMUSCULAR; INTRAVENOUS EVERY 5 MIN PRN
Status: DISCONTINUED | OUTPATIENT
Start: 2020-01-08 | End: 2020-01-08 | Stop reason: HOSPADM

## 2020-01-08 RX ORDER — LABETALOL HYDROCHLORIDE 5 MG/ML
5 INJECTION, SOLUTION INTRAVENOUS EVERY 5 MIN PRN
Status: DISCONTINUED | OUTPATIENT
Start: 2020-01-08 | End: 2020-01-08 | Stop reason: HOSPADM

## 2020-01-08 RX ORDER — HYDROCODONE BITARTRATE AND ACETAMINOPHEN 5; 325 MG/1; MG/1
2 TABLET ORAL AS NEEDED
Status: DISCONTINUED | OUTPATIENT
Start: 2020-01-08 | End: 2020-01-08 | Stop reason: HOSPADM

## 2020-01-08 RX ORDER — HYDROMORPHONE HYDROCHLORIDE 1 MG/ML
0.4 INJECTION, SOLUTION INTRAMUSCULAR; INTRAVENOUS; SUBCUTANEOUS EVERY 5 MIN PRN
Status: DISCONTINUED | OUTPATIENT
Start: 2020-01-08 | End: 2020-01-08 | Stop reason: HOSPADM

## 2020-01-08 RX ORDER — HYDROCODONE BITARTRATE AND ACETAMINOPHEN 5; 325 MG/1; MG/1
1 TABLET ORAL AS NEEDED
Status: DISCONTINUED | OUTPATIENT
Start: 2020-01-08 | End: 2020-01-08 | Stop reason: HOSPADM

## 2020-01-08 RX ORDER — CEFOXITIN 2 G/1
INJECTION, POWDER, FOR SOLUTION INTRAVENOUS AS NEEDED
Status: DISCONTINUED | OUTPATIENT
Start: 2020-01-08 | End: 2020-01-08 | Stop reason: SURG

## 2020-01-08 RX ORDER — KETOROLAC TROMETHAMINE 30 MG/ML
INJECTION, SOLUTION INTRAMUSCULAR; INTRAVENOUS AS NEEDED
Status: DISCONTINUED | OUTPATIENT
Start: 2020-01-08 | End: 2020-01-08 | Stop reason: SURG

## 2020-01-08 RX ORDER — ROCURONIUM BROMIDE 10 MG/ML
INJECTION, SOLUTION INTRAVENOUS AS NEEDED
Status: DISCONTINUED | OUTPATIENT
Start: 2020-01-08 | End: 2020-01-08 | Stop reason: SURG

## 2020-01-08 RX ORDER — ONDANSETRON 2 MG/ML
INJECTION INTRAMUSCULAR; INTRAVENOUS AS NEEDED
Status: DISCONTINUED | OUTPATIENT
Start: 2020-01-08 | End: 2020-01-08 | Stop reason: SURG

## 2020-01-08 RX ORDER — NALOXONE HYDROCHLORIDE 0.4 MG/ML
80 INJECTION, SOLUTION INTRAMUSCULAR; INTRAVENOUS; SUBCUTANEOUS AS NEEDED
Status: DISCONTINUED | OUTPATIENT
Start: 2020-01-08 | End: 2020-01-08 | Stop reason: HOSPADM

## 2020-01-08 RX ORDER — ONDANSETRON 2 MG/ML
4 INJECTION INTRAMUSCULAR; INTRAVENOUS AS NEEDED
Status: DISCONTINUED | OUTPATIENT
Start: 2020-01-08 | End: 2020-01-08 | Stop reason: HOSPADM

## 2020-01-08 RX ORDER — NEOSTIGMINE METHYLSULFATE 1 MG/ML
INJECTION INTRAVENOUS AS NEEDED
Status: DISCONTINUED | OUTPATIENT
Start: 2020-01-08 | End: 2020-01-08 | Stop reason: SURG

## 2020-01-08 RX ORDER — GLYCOPYRROLATE 0.2 MG/ML
INJECTION, SOLUTION INTRAMUSCULAR; INTRAVENOUS AS NEEDED
Status: DISCONTINUED | OUTPATIENT
Start: 2020-01-08 | End: 2020-01-08 | Stop reason: SURG

## 2020-01-08 RX ORDER — MEPERIDINE HYDROCHLORIDE 25 MG/ML
INJECTION INTRAMUSCULAR; INTRAVENOUS; SUBCUTANEOUS
Status: COMPLETED
Start: 2020-01-08 | End: 2020-01-08

## 2020-01-08 RX ORDER — MIDAZOLAM HYDROCHLORIDE 1 MG/ML
INJECTION INTRAMUSCULAR; INTRAVENOUS AS NEEDED
Status: DISCONTINUED | OUTPATIENT
Start: 2020-01-08 | End: 2020-01-08 | Stop reason: SURG

## 2020-01-08 RX ORDER — MEPERIDINE HYDROCHLORIDE 25 MG/ML
12.5 INJECTION INTRAMUSCULAR; INTRAVENOUS; SUBCUTANEOUS AS NEEDED
Status: COMPLETED | OUTPATIENT
Start: 2020-01-08 | End: 2020-01-08

## 2020-01-08 RX ORDER — METOCLOPRAMIDE HYDROCHLORIDE 5 MG/ML
INJECTION INTRAMUSCULAR; INTRAVENOUS AS NEEDED
Status: DISCONTINUED | OUTPATIENT
Start: 2020-01-08 | End: 2020-01-08 | Stop reason: SURG

## 2020-01-08 RX ORDER — DEXAMETHASONE SODIUM PHOSPHATE 4 MG/ML
VIAL (ML) INJECTION AS NEEDED
Status: DISCONTINUED | OUTPATIENT
Start: 2020-01-08 | End: 2020-01-08 | Stop reason: SURG

## 2020-01-08 RX ADMIN — DEXAMETHASONE SODIUM PHOSPHATE 8 MG: 4 MG/ML VIAL (ML) INJECTION at 18:20:00

## 2020-01-08 RX ADMIN — NEOSTIGMINE METHYLSULFATE 5 MG: 1 INJECTION INTRAVENOUS at 19:26:00

## 2020-01-08 RX ADMIN — MIDAZOLAM HYDROCHLORIDE 2 MG: 1 INJECTION INTRAMUSCULAR; INTRAVENOUS at 18:09:00

## 2020-01-08 RX ADMIN — ROCURONIUM BROMIDE 50 MG: 10 INJECTION, SOLUTION INTRAVENOUS at 18:20:00

## 2020-01-08 RX ADMIN — GLYCOPYRROLATE 0.6 MG: 0.2 INJECTION, SOLUTION INTRAMUSCULAR; INTRAVENOUS at 19:26:00

## 2020-01-08 RX ADMIN — ONDANSETRON 4 MG: 2 INJECTION INTRAMUSCULAR; INTRAVENOUS at 19:17:00

## 2020-01-08 RX ADMIN — SODIUM CHLORIDE, SODIUM LACTATE, POTASSIUM CHLORIDE, CALCIUM CHLORIDE: 600; 310; 30; 20 INJECTION, SOLUTION INTRAVENOUS at 19:38:00

## 2020-01-08 RX ADMIN — SODIUM CHLORIDE, SODIUM LACTATE, POTASSIUM CHLORIDE, CALCIUM CHLORIDE: 600; 310; 30; 20 INJECTION, SOLUTION INTRAVENOUS at 18:08:00

## 2020-01-08 RX ADMIN — KETOROLAC TROMETHAMINE 30 MG: 30 INJECTION, SOLUTION INTRAMUSCULAR; INTRAVENOUS at 19:17:00

## 2020-01-08 RX ADMIN — CEFOXITIN 2 G: 2 INJECTION, POWDER, FOR SOLUTION INTRAVENOUS at 18:22:00

## 2020-01-08 RX ADMIN — METOCLOPRAMIDE HYDROCHLORIDE 10 MG: 5 INJECTION INTRAMUSCULAR; INTRAVENOUS at 18:20:00

## 2020-01-08 RX ADMIN — SODIUM CHLORIDE, SODIUM LACTATE, POTASSIUM CHLORIDE, CALCIUM CHLORIDE: 600; 310; 30; 20 INJECTION, SOLUTION INTRAVENOUS at 18:44:00

## 2020-01-08 NOTE — ANESTHESIA PREPROCEDURE EVALUATION
PRE-OP EVALUATION    Patient Name: Olvin Mock    Pre-op Diagnosis: Cholelithiases [K80.20]    Procedure(s):  LAPAROSCOPIC CHOLECYSTECTOMY WITH INTRAOPERATIVE CHOLANGIOGRAMS    Surgeon(s) and Role:     Bulmaro Germain, DO - Primary    Pre-op vitals r Endo/Other           (+) hypothyroidism                       Pulmonary      (+) asthma                     Neuro/Psych    Negative neuro/psych ROS.                                 Past Surgical History:   Procedure Laterality Date   • COLONOSCOPY  2012

## 2020-01-08 NOTE — PLAN OF CARE
Pt signed consent for EUS/ERCP to be done later this afternoon around 1600 per Bronwyn Buckle. All questions answered.

## 2020-01-08 NOTE — H&P
[facility]  History and Physical     Tyler Boone Patient Status:  Emergency    1990 MRN [id number]   Location [facility] Attending Angie Oneill MD   [facility]     Chief Complaint: Abd 0  Nebulizer Does not apply Misc, Use with albuterol every 4 hours as needed for wheezing, chest tightness or shortness of breath.  DX: J45.901 (Patient not taking: Reported on 1/7/2020 ), Disp: 1 each, Rfl: 0  Phentermine HCl 15 MG Oral Cap, Take 1 capsule input(s): PTP, INR in the last 168 hours. No results for input(s): TROP, CK in the last 168 hours. Imaging: Imaging data reviewed in Epic. CONCLUSION:       1. Cholelithiasis without evidence of acute cholecystitis.      2. Dilated common bile duct is no

## 2020-01-08 NOTE — CONSULTS
[facility]                       Gastroenterology Consultation-[facility]    Higinio Canavan Patient Status:  Observation    1990 MRN [id number]   [facility] 3NW-A Attending Randall Aschoff, MD   Hosp Day # 0 PCP [facility] Or  morphINE sulfate (PF) 4 MG/ML injection 2 mg, 2 mg, Intravenous, Q2H PRN    Or  morphINE sulfate (PF) 4 MG/ML injection 4 mg, 4 mg, Intravenous, Q2H PRN  Metoclopramide HCl (REGLAN) injection 10 mg, 10 mg, Intravenous, Q8H PRN  ondansetron HCl (ZOFRAN) (1.549 m)   Wt 190 lb (86.2 kg)   LMP 12/15/2019   SpO2 100%   BMI 35.90 kg/m²   Gen: AAO x 3, able to speak in complete sentences  HENT: EOMI, PERRL, oropharynx is clear with moist mucosal membranes  Eyes: Sclerae are anicteric  Neck:  Supple without nuch aorta.     PATIENT STATED HISTORY: (As transcribed by Technologist)  Patient complaint of RUQ pain for 2 days. FINDINGS:    LIVER:  Normal size and heterogeneous echogenicity. No significant masses.   BILIARY:  Normal appearing intrahepatic ducts, and c egd/colon in 2012 for anemia. Abd US with CBD 11 mm and cholelithiasis. LFTs downtrending now with T bili 3.3 to 1.5. Improved pain now.      Assessment and Plan:  - likely passed stone vs choledocholithiasis   - d/w Dr. Yamilex Ortega, pt to go for lap prema with I

## 2020-01-08 NOTE — PROGRESS NOTES
[facility]  Progress Note     Higinio Canavan Patient Status:  Observation    1990 MRN [id number]   [facility] 3NW-A Attending Randall Aschoff, MD   Hosp Day # 0 PCP [facility]     Chief Complaint: RUQ pain    S: Patient withou input(s): ELIER BILLINGS in the last 168 hours. Imaging: Imaging data reviewed in Epic. Medications:     ASSESSMENT / PLAN:     1. Choledocolithiasis:  EUS/ERCP today, surgery consult for eventual cholecystectomy.   LFT's better today    Plan of care:

## 2020-01-08 NOTE — ED INITIAL ASSESSMENT (HPI)
Epigastic pain, had US which shows possibility of an obstructing lesion or calculus cannot be excluded. Patient needs LFTs and Lipase and possible admission depending on results.

## 2020-01-08 NOTE — CONSULTS
[facility]  Report of Consultation    Golden Soria Patient Status:  Observation    1990 MRN [id number]   [facility] 3NW-A Attending George Cortez MD   Hosp Day # 0 [address]     Reason for Consultation:  Epigastric abdo has never used smokeless tobacco. She reports current alcohol use. She reports that she does not use drugs.     Allergies:    No Known Allergies          Medications:    Current Facility-Administered Medications:   •  0.9% NaCl infusion, , Intravenous, Cont membranes are moist. EOM are WNL. Neck: No tenderness to palpitation. Full range of motion to flexion and extension, lateral rotation and lateral flexion of cervical spine. No JVD. Supple. Lungs: Clear to auscultation bilaterally.  No wheezes, no r intrahepatic ducts, and common bile duct. Common bile duct diameter is 11 mm. Gallstones in the gallbladder are noted. PANCREAS:  Normal.  SPLEEN:  Normal.  KIDNEYS:  Normal.  Right kidney measures 10.9 cm. Left kidney measures 11.3 cm.   AORTA/IVC:  No

## 2020-01-08 NOTE — PLAN OF CARE
Pt alert and oriented x 4. Up ad tristian. Voiding with no difficulties. Lung sounds clear on room air. Abdomen soft and rounded. Bowel sounds present. Pt denies nausea. Pt denies abdominal pain at this time. Passing flatus. VSS. IVFs infusing. POC discussed.  C Progressing  Goal: Maintains or returns to baseline bowel function  Description  INTERVENTIONS:  - Assess bowel function  - Maintain adequate hydration with IV or PO as ordered and tolerated  - Evaluate effectiveness of GI medications  - Encourage mobiliza

## 2020-01-08 NOTE — PLAN OF CARE
Pt resting without c/o nausea. Pt abd soft with active bowel sounds. Pt rating abd pain at 1 and declines any analgesia. GI to see and formulate POC.

## 2020-01-08 NOTE — PROGRESS NOTES
Patient admitted via cart from the ED. Admission navigator completed. POC discussed. Oriented to room. Safety precautions initiated. Bed in low position. Call light in reach.

## 2020-01-09 VITALS
DIASTOLIC BLOOD PRESSURE: 68 MMHG | SYSTOLIC BLOOD PRESSURE: 114 MMHG | OXYGEN SATURATION: 97 % | TEMPERATURE: 98 F | BODY MASS INDEX: 35.87 KG/M2 | RESPIRATION RATE: 17 BRPM | HEART RATE: 70 BPM | HEIGHT: 61 IN | WEIGHT: 190 LBS

## 2020-01-09 LAB
ALBUMIN SERPL-MCNC: 2.5 G/DL (ref 3.4–5)
ALBUMIN/GLOB SERPL: 0.6 {RATIO} (ref 1–2)
ALP LIVER SERPL-CCNC: 137 U/L (ref 37–98)
ALT SERPL-CCNC: 273 U/L (ref 13–56)
ANION GAP SERPL CALC-SCNC: 7 MMOL/L (ref 0–18)
AST SERPL-CCNC: 81 U/L (ref 15–37)
BASOPHILS # BLD AUTO: 0.02 X10(3) UL (ref 0–0.2)
BASOPHILS NFR BLD AUTO: 0.2 %
BILIRUB SERPL-MCNC: 0.6 MG/DL (ref 0.1–2)
BUN BLD-MCNC: 8 MG/DL (ref 7–18)
BUN/CREAT SERPL: 7.8 (ref 10–20)
CALCIUM BLD-MCNC: 8.4 MG/DL (ref 8.5–10.1)
CHLORIDE SERPL-SCNC: 111 MMOL/L (ref 98–112)
CO2 SERPL-SCNC: 21 MMOL/L (ref 21–32)
CREAT BLD-MCNC: 1.02 MG/DL (ref 0.55–1.02)
DEPRECATED RDW RBC AUTO: 42 FL (ref 35.1–46.3)
EOSINOPHIL # BLD AUTO: 0 X10(3) UL (ref 0–0.7)
EOSINOPHIL NFR BLD AUTO: 0 %
ERYTHROCYTE [DISTWIDTH] IN BLOOD BY AUTOMATED COUNT: 13.8 % (ref 11–15)
GLOBULIN PLAS-MCNC: 3.9 G/DL (ref 2.8–4.4)
GLUCOSE BLD-MCNC: 91 MG/DL (ref 70–99)
HCT VFR BLD AUTO: 33.2 % (ref 35–48)
HGB BLD-MCNC: 10.5 G/DL (ref 12–16)
IMM GRANULOCYTES # BLD AUTO: 0.06 X10(3) UL (ref 0–1)
IMM GRANULOCYTES NFR BLD: 0.5 %
LYMPHOCYTES # BLD AUTO: 1.68 X10(3) UL (ref 1–4)
LYMPHOCYTES NFR BLD AUTO: 14.9 %
M PROTEIN MFR SERPL ELPH: 6.4 G/DL (ref 6.4–8.2)
MCH RBC QN AUTO: 26.3 PG (ref 26–34)
MCHC RBC AUTO-ENTMCNC: 31.6 G/DL (ref 31–37)
MCV RBC AUTO: 83.2 FL (ref 80–100)
MONOCYTES # BLD AUTO: 0.57 X10(3) UL (ref 0.1–1)
MONOCYTES NFR BLD AUTO: 5.1 %
NEUTROPHILS # BLD AUTO: 8.94 X10 (3) UL (ref 1.5–7.7)
NEUTROPHILS # BLD AUTO: 8.94 X10(3) UL (ref 1.5–7.7)
NEUTROPHILS NFR BLD AUTO: 79.3 %
OSMOLALITY SERPL CALC.SUM OF ELEC: 286 MOSM/KG (ref 275–295)
PLATELET # BLD AUTO: 313 10(3)UL (ref 150–450)
POTASSIUM SERPL-SCNC: 4.6 MMOL/L (ref 3.5–5.1)
RBC # BLD AUTO: 3.99 X10(6)UL (ref 3.8–5.3)
SODIUM SERPL-SCNC: 139 MMOL/L (ref 136–145)
WBC # BLD AUTO: 11.3 X10(3) UL (ref 4–11)

## 2020-01-09 PROCEDURE — 99217 OBSERVATION CARE DISCHARGE: CPT | Performed by: HOSPITALIST

## 2020-01-09 RX ORDER — HYDROCODONE BITARTRATE AND ACETAMINOPHEN 5; 325 MG/1; MG/1
1 TABLET ORAL EVERY 6 HOURS PRN
Qty: 20 TABLET | Refills: 0 | Status: SHIPPED | OUTPATIENT
Start: 2020-01-09 | End: 2020-02-07

## 2020-01-09 NOTE — PROGRESS NOTES
[facility]  Progress Note    Serina Luis Patient Status:  Observation    1990 MRN [id number]   [facility] 3NW-A Attending Danny Ghotra MD   Hosp Day # 0 PCP [facility]     Subjective:  No new complaints, incisional pain.  A Biliary colic     Hashimoto's thyroiditis     Gall stones, common bile duct    POD 1 laparoscopic cholecystectomy with cholangiogram    Plan:  1. Surgical findings discussed with patient. 2. Advance diet as tolerated  3. Patient able to be DC today   4.  A

## 2020-01-09 NOTE — ANESTHESIA POSTPROCEDURE EVALUATION
[facility] Patient Status:  Observation   Age/Gender 30year old female MRN [id number]   Location [facility] Attending Oly Denis MD   Hosp Day # 0 [facility]       Anesthesia Post-op Note

## 2020-01-09 NOTE — PLAN OF CARE
Patient voided clear alejandro urine without difficulty, patient on her menses. Ambulated in guerra with stand by assistance and tolerated well. Reports pain at tolerable level.  Discussed plan to try oral pain medication, patient verbalizes understanding and is

## 2020-01-09 NOTE — PLAN OF CARE
Pt tolerated FL diet without nausea. Pt abd soft with active bowel sounds; pt reports passing flatus. Abd lap sites x4 CDI with steri-strips. Pt voiding without difficulty.   Per pt, surgeon was here and OK'd discharge and discharge instructions were giv

## 2020-01-09 NOTE — PROGRESS NOTES
[facility] Gastroenterology Progress Note    S: PT with no abd pain at this time and doing well s/p lap prema     O: /73 (BP Location: Right arm)   Pulse 70   Temp 98.1 °F (36.7 °C) (Oral)   Resp 16   Ht 61\"   Wt 190 lb (86.2 kg)   LMP 12/15/20

## 2020-01-09 NOTE — PLAN OF CARE
Problem: Patient/Family Goals  Goal: Patient/Family Long Term Goal  Description  Patient's Long Term Goal: Discharge home with instructions regarding wound care, pain control & signs of when to call the doctor.      Interventions:  - Educate patient upon measures as appropriate  - Advance diet as tolerated, if ordered  - Obtain nutritional consult as needed  - Evaluate fluid balance  Outcome: Progressing  Goal: Maintains or returns to baseline bowel function  Description  INTERVENTIONS:  - Assess bowel fun hypoactive. Lap sites x 4 with steri-strips clean, dry and intact. Patient updated on plan of care and verbalizes understanding. Will continue to monitor patient.

## 2020-01-09 NOTE — ANESTHESIA PROCEDURE NOTES
Airway  Urgency: elective      General Information and Staff    Patient location during procedure: OR  Anesthesiologist: Leora Santos MD  Performed: anesthesiologist     Indications and Patient Condition  Indications for airway management: anesthesia  Sed

## 2020-01-09 NOTE — OPERATIVE REPORT
[facility]  Operative Note     Vicie Cue Location: OR   [facility] [id number] MRN [id number]   Admission Date 1/7/2020 Operation Date 1/8/2020   Attending Physician Aidan Carter MD Operating Physician Margaretmary Goldberg, DO      Preoperative Diagnosis: Acu grasped and retracted laterally. Blunt and sharp dissection was carried out within the [address] of [place] until the cystic duct and cystic artery were isolated. A single clip was placed on the proximal cystic duct a small opening made in the cystic duct.

## 2020-01-09 NOTE — PROGRESS NOTES
[facility]  Progress Note     Farhana Ashley Patient Status:  Observation    1990 MRN [id number]   [facility] 3NW-A Attending Carol Houser MD   Hosp Day # 0 PCP [facility]     Chief Complaint: RUQ pain    S: Patient doing INR 1.14*       No results for input(s): TROP, CK in the last 168 hours. Imaging: Imaging data reviewed in Epic. Medications:     ASSESSMENT / PLAN:     1. Choledocolithiasis:  IOC negative, s/p cholecystectomy.     Plan of care: As above    Benson Correa

## 2020-01-09 NOTE — DISCHARGE SUMMARY
[facility]OS[facility]ITAL[facility]  DISCHARGE SUMMARY     Lara Dumont Patient Status:  Observation    1990 MRN [id number]   [facility] 3NW-A Attending No att. providers found   Hosp Day # 0 PCP [address] [place] [address] [place]     Date of Admission: 2020  Date of 20 tablet  Refills:  0        CONTINUE taking these medications      Instructions Prescription details   albuterol sulfate (2.5 MG/3ML) 0.083% Nebu  Commonly known as:  VENTOLIN      Take 3 mL (2.5 mg total) by nebulization every 4 (four) hours as needed focal neurological deficits. Musculoskeletal: Moves all extremities. Extremities: No edema.   -----------------------------------------------------------------------------------------------  PATIENT DISCHARGE INSTRUCTIONS: See electronic chart    Dank MORALES

## 2020-01-23 NOTE — ED PROVIDER NOTES
Patient Seen in: [facility] 3nw-a      History   Patient presents with:  Abdomen/Flank Pain    Stated Complaint: epigastric abdominal pain from IC - sent over for R/O calculous vs obstructing *    HPI    Patient sent from [address] [place] [address] with RUQ pain.  US showed well-nourished, mild distress secondary to pain  Head and neck: Normocephalic, atraumatic, pupils equal round and reactive to light, oropharynx clear  Neck: No JVD, no lymphadenopathy, no tenderness, swelling, deformity  Cardiovascular: Regular rate and rh (*)     Albumin 2.5 (*)     A/G Ratio 0.6 (*)     All other components within normal limits   CBC W/ DIFFERENTIAL - Abnormal; Notable for the following components:    MCH 25.5 (*)     All other components within normal limits   CBC W/ DIFFERENTIAL - Abnorm observation, NPO.  Hospitalist notified      Admission disposition: 1/7/2020  8:18 PM                   Disposition and Plan     Clinical Impression:  Gallstones  (primary encounter diagnosis)  Gall stones, common bile duct  Cholelithiases    Disposition:

## 2020-02-07 ENCOUNTER — OFFICE VISIT (OUTPATIENT)
Dept: SURGERY | Facility: CLINIC | Age: 30
End: 2020-02-07

## 2020-02-07 VITALS
HEART RATE: 89 BPM | WEIGHT: 190 LBS | TEMPERATURE: 98 F | SYSTOLIC BLOOD PRESSURE: 138 MMHG | BODY MASS INDEX: 36 KG/M2 | DIASTOLIC BLOOD PRESSURE: 84 MMHG

## 2020-02-07 DIAGNOSIS — Z90.49 HISTORY OF CHOLECYSTECTOMY: Primary | ICD-10-CM

## 2020-02-07 PROBLEM — K80.20 GALLSTONES: Status: RESOLVED | Noted: 2020-01-07 | Resolved: 2020-02-07

## 2020-02-07 PROBLEM — K80.50 GALL STONES, COMMON BILE DUCT: Status: RESOLVED | Noted: 2020-01-07 | Resolved: 2020-02-07

## 2020-02-07 PROCEDURE — 99024 POSTOP FOLLOW-UP VISIT: CPT | Performed by: PHYSICIAN ASSISTANT

## 2020-02-07 NOTE — PROGRESS NOTES
Post Operative Visit Note       Active Problems  1. History of cholecystectomy         Chief Complaint   Patient presents with:  Post-Op: p/o 1/8 lap prema.  pt denies fever or chills         History of Present Illness   The patient presents for postoperati 50 mcg by mouth before breakfast.  , Disp: , Rfl:   •  CONTRAVE 8-90 MG Oral Tablet 12 Hr, Take 1 tablet by mouth daily. , Disp: , Rfl:   •  ferrous sulfate 325 (65 FE) MG Oral Tab EC, Take 325 mg by mouth daily with breakfast., Disp: , Rfl:   •  albuterol sounds are normal. She exhibits no distension. There is no tenderness. There is no rebound. Abdomen is soft, nontender to palpation. Nondistended. There is no rebound tenderness. There is no guarding. Incisions of healed well.   There is no surroundin

## 2020-06-17 NOTE — PLAN OF CARE
Pt given dc instructions; RX for Stanislaw Alla went to pt's pharmacy. Pt stated understanding of instructions and was discharged via ambulatory with all belongings. numerical 0-10

## 2022-02-04 ENCOUNTER — LAB ENCOUNTER (OUTPATIENT)
Dept: LAB | Age: 32
End: 2022-02-04
Attending: INTERNAL MEDICINE
Payer: COMMERCIAL

## 2022-02-04 ENCOUNTER — OFFICE VISIT (OUTPATIENT)
Dept: INTERNAL MEDICINE CLINIC | Facility: CLINIC | Age: 32
End: 2022-02-04
Payer: COMMERCIAL

## 2022-02-04 VITALS
TEMPERATURE: 97 F | SYSTOLIC BLOOD PRESSURE: 120 MMHG | RESPIRATION RATE: 16 BRPM | OXYGEN SATURATION: 99 % | BODY MASS INDEX: 46.63 KG/M2 | DIASTOLIC BLOOD PRESSURE: 90 MMHG | HEART RATE: 89 BPM | HEIGHT: 61 IN | WEIGHT: 247 LBS

## 2022-02-04 DIAGNOSIS — F43.9 STRESS: ICD-10-CM

## 2022-02-04 DIAGNOSIS — E03.9 HYPOTHYROIDISM, UNSPECIFIED TYPE: Primary | ICD-10-CM

## 2022-02-04 DIAGNOSIS — D50.0 IRON DEFICIENCY ANEMIA DUE TO CHRONIC BLOOD LOSS: ICD-10-CM

## 2022-02-04 DIAGNOSIS — E06.3 HASHIMOTO'S THYROIDITIS: ICD-10-CM

## 2022-02-04 DIAGNOSIS — E66.01 CLASS 3 SEVERE OBESITY DUE TO EXCESS CALORIES WITH SERIOUS COMORBIDITY AND BODY MASS INDEX (BMI) OF 45.0 TO 49.9 IN ADULT (HCC): ICD-10-CM

## 2022-02-04 DIAGNOSIS — E03.9 HYPOTHYROIDISM, UNSPECIFIED TYPE: ICD-10-CM

## 2022-02-04 DIAGNOSIS — J45.20 MILD INTERMITTENT ASTHMA WITHOUT COMPLICATION: ICD-10-CM

## 2022-02-04 PROBLEM — E66.813 CLASS 3 SEVERE OBESITY DUE TO EXCESS CALORIES WITH SERIOUS COMORBIDITY AND BODY MASS INDEX (BMI) OF 45.0 TO 49.9 IN ADULT (HCC): Status: ACTIVE | Noted: 2018-10-15

## 2022-02-04 PROBLEM — E66.9 CLASS 2 OBESITY WITHOUT SERIOUS COMORBIDITY WITH BODY MASS INDEX (BMI) OF 35.0 TO 35.9 IN ADULT: Status: RESOLVED | Noted: 2018-10-15 | Resolved: 2022-02-04

## 2022-02-04 PROBLEM — D50.9 IRON DEFICIENCY ANEMIA: Status: ACTIVE | Noted: 2020-06-15

## 2022-02-04 PROBLEM — E66.812 CLASS 2 OBESITY WITHOUT SERIOUS COMORBIDITY WITH BODY MASS INDEX (BMI) OF 35.0 TO 35.9 IN ADULT: Status: RESOLVED | Noted: 2018-10-15 | Resolved: 2022-02-04

## 2022-02-04 PROBLEM — D64.9 ANEMIA: Status: RESOLVED | Noted: 2018-10-15 | Resolved: 2022-02-04

## 2022-02-04 PROBLEM — Z51.81 THERAPEUTIC DRUG MONITORING: Status: RESOLVED | Noted: 2018-10-15 | Resolved: 2022-02-04

## 2022-02-04 PROBLEM — E66.813 CLASS 3 SEVERE OBESITY DUE TO EXCESS CALORIES WITH SERIOUS COMORBIDITY AND BODY MASS INDEX (BMI) OF 45.0 TO 49.9 IN ADULT: Status: ACTIVE | Noted: 2018-10-15

## 2022-02-04 LAB
ALT SERPL-CCNC: 19 U/L
ANION GAP SERPL CALC-SCNC: 6 MMOL/L (ref 0–18)
AST SERPL-CCNC: 25 U/L (ref 15–37)
BASOPHILS # BLD AUTO: 0.14 X10(3) UL (ref 0–0.2)
BASOPHILS NFR BLD AUTO: 2 %
BUN BLD-MCNC: 8 MG/DL (ref 7–18)
CALCIUM BLD-MCNC: 10.4 MG/DL (ref 8.5–10.1)
CHLORIDE SERPL-SCNC: 105 MMOL/L (ref 98–112)
CHOLEST SERPL-MCNC: 402 MG/DL (ref ?–200)
CO2 SERPL-SCNC: 26 MMOL/L (ref 21–32)
CREAT BLD-MCNC: 1.33 MG/DL
DEPRECATED HBV CORE AB SER IA-ACNC: 11.4 NG/ML
EOSINOPHIL # BLD AUTO: 0.14 X10(3) UL (ref 0–0.7)
EOSINOPHIL NFR BLD AUTO: 2 %
ERYTHROCYTE [DISTWIDTH] IN BLOOD BY AUTOMATED COUNT: 15.1 %
EST. AVERAGE GLUCOSE BLD GHB EST-MCNC: 148 MG/DL (ref 68–126)
FASTING STATUS PATIENT QL REPORTED: YES
FOLATE SERPL-MCNC: 9.6 NG/ML (ref 8.7–?)
GLUCOSE BLD-MCNC: 97 MG/DL (ref 70–99)
HBA1C MFR BLD: 6.8 % (ref ?–5.7)
HCT VFR BLD AUTO: 36 %
HCV AB SERPL QL IA: NONREACTIVE
HDLC SERPL-MCNC: 70 MG/DL (ref 40–59)
HGB BLD-MCNC: 10.8 G/DL
IMM GRANULOCYTES # BLD AUTO: 0.03 X10(3) UL (ref 0–1)
IMM GRANULOCYTES NFR BLD: 0.4 %
LDLC SERPL CALC-MCNC: 284 MG/DL (ref ?–100)
LYMPHOCYTES # BLD AUTO: 2.72 X10(3) UL (ref 1–4)
LYMPHOCYTES NFR BLD AUTO: 38.1 %
MCH RBC QN AUTO: 26 PG (ref 26–34)
MCHC RBC AUTO-ENTMCNC: 30 G/DL (ref 31–37)
MCV RBC AUTO: 86.5 FL
MONOCYTES # BLD AUTO: 0.5 X10(3) UL (ref 0.1–1)
MONOCYTES NFR BLD AUTO: 7 %
NEUTROPHILS # BLD AUTO: 3.6 X10 (3) UL (ref 1.5–7.7)
NEUTROPHILS # BLD AUTO: 3.6 X10(3) UL (ref 1.5–7.7)
NEUTROPHILS NFR BLD AUTO: 50.5 %
NONHDLC SERPL-MCNC: 332 MG/DL (ref ?–130)
OSMOLALITY SERPL CALC.SUM OF ELEC: 282 MOSM/KG (ref 275–295)
PLATELET # BLD AUTO: 381 10(3)UL (ref 150–450)
POTASSIUM SERPL-SCNC: 4.4 MMOL/L (ref 3.5–5.1)
RBC # BLD AUTO: 4.16 X10(6)UL
SODIUM SERPL-SCNC: 137 MMOL/L (ref 136–145)
T4 FREE SERPL-MCNC: 0.2 NG/DL (ref 0.8–1.7)
TRIGL SERPL-MCNC: 227 MG/DL (ref 30–149)
TSI SER-ACNC: >100 MIU/ML (ref 0.36–3.74)
VIT B12 SERPL-MCNC: 486 PG/ML (ref 193–986)
VIT D+METAB SERPL-MCNC: 20.5 NG/ML (ref 30–100)
VLDLC SERPL CALC-MCNC: 59 MG/DL (ref 0–30)
WBC # BLD AUTO: 7.1 X10(3) UL (ref 4–11)

## 2022-02-04 PROCEDURE — 85025 COMPLETE CBC W/AUTO DIFF WBC: CPT

## 2022-02-04 PROCEDURE — 36415 COLL VENOUS BLD VENIPUNCTURE: CPT

## 2022-02-04 PROCEDURE — 82306 VITAMIN D 25 HYDROXY: CPT

## 2022-02-04 PROCEDURE — 82728 ASSAY OF FERRITIN: CPT

## 2022-02-04 PROCEDURE — 83036 HEMOGLOBIN GLYCOSYLATED A1C: CPT

## 2022-02-04 PROCEDURE — 86803 HEPATITIS C AB TEST: CPT

## 2022-02-04 PROCEDURE — 3008F BODY MASS INDEX DOCD: CPT | Performed by: INTERNAL MEDICINE

## 2022-02-04 PROCEDURE — 99214 OFFICE O/P EST MOD 30 MIN: CPT | Performed by: INTERNAL MEDICINE

## 2022-02-04 PROCEDURE — 84450 TRANSFERASE (AST) (SGOT): CPT

## 2022-02-04 PROCEDURE — 84460 ALANINE AMINO (ALT) (SGPT): CPT

## 2022-02-04 PROCEDURE — 80048 BASIC METABOLIC PNL TOTAL CA: CPT

## 2022-02-04 PROCEDURE — 3080F DIAST BP >= 90 MM HG: CPT | Performed by: INTERNAL MEDICINE

## 2022-02-04 PROCEDURE — 82607 VITAMIN B-12: CPT

## 2022-02-04 PROCEDURE — 84443 ASSAY THYROID STIM HORMONE: CPT

## 2022-02-04 PROCEDURE — 3044F HG A1C LEVEL LT 7.0%: CPT | Performed by: INTERNAL MEDICINE

## 2022-02-04 PROCEDURE — 82746 ASSAY OF FOLIC ACID SERUM: CPT

## 2022-02-04 PROCEDURE — 84439 ASSAY OF FREE THYROXINE: CPT

## 2022-02-04 PROCEDURE — 3074F SYST BP LT 130 MM HG: CPT | Performed by: INTERNAL MEDICINE

## 2022-02-04 PROCEDURE — 80061 LIPID PANEL: CPT

## 2022-02-04 RX ORDER — LEVOTHYROXINE SODIUM 200 UG/ML
200 SOLUTION ORAL DAILY
COMMUNITY
End: 2022-02-05

## 2022-02-04 RX ORDER — FLUTICASONE PROPIONATE 50 MCG
1 SPRAY, SUSPENSION (ML) NASAL DAILY
COMMUNITY
Start: 2021-10-14

## 2022-02-04 RX ORDER — BUDESONIDE AND FORMOTEROL FUMARATE DIHYDRATE 160; 4.5 UG/1; UG/1
1 AEROSOL RESPIRATORY (INHALATION) DAILY
COMMUNITY

## 2022-02-04 NOTE — PATIENT INSTRUCTIONS
You should take the levothyroxine in the following way:  1. Take it first thing in the morning on an empty stomach with only water  2. Wait 30-60 minutes to eat or drink anything other than water or take any of your other prescriptions  3.  You must wait at least 4 hours to take any vitamins or supplements because they will bind to the thyroid pill and make it more difficult to absorb into your bloodstream.

## 2022-02-05 ENCOUNTER — PATIENT MESSAGE (OUTPATIENT)
Dept: INTERNAL MEDICINE CLINIC | Facility: CLINIC | Age: 32
End: 2022-02-05

## 2022-02-05 PROBLEM — E78.5 HYPERLIPIDEMIA ASSOCIATED WITH TYPE 2 DIABETES MELLITUS  (HCC): Status: ACTIVE | Noted: 2022-02-05

## 2022-02-05 PROBLEM — E78.5 HYPERLIPIDEMIA ASSOCIATED WITH TYPE 2 DIABETES MELLITUS: Status: ACTIVE | Noted: 2022-02-05

## 2022-02-05 PROBLEM — E78.5 HYPERLIPIDEMIA ASSOCIATED WITH TYPE 2 DIABETES MELLITUS (HCC): Status: ACTIVE | Noted: 2022-02-05

## 2022-02-05 PROBLEM — E11.69 HYPERLIPIDEMIA ASSOCIATED WITH TYPE 2 DIABETES MELLITUS (HCC): Status: ACTIVE | Noted: 2022-02-05

## 2022-02-05 PROBLEM — E11.69 HYPERLIPIDEMIA ASSOCIATED WITH TYPE 2 DIABETES MELLITUS: Status: ACTIVE | Noted: 2022-02-05

## 2022-02-05 PROBLEM — E11.9 TYPE 2 DIABETES MELLITUS WITHOUT COMPLICATION, WITHOUT LONG-TERM CURRENT USE OF INSULIN (HCC): Status: ACTIVE | Noted: 2022-02-05

## 2022-02-05 PROBLEM — E11.69 HYPERLIPIDEMIA ASSOCIATED WITH TYPE 2 DIABETES MELLITUS  (HCC): Status: ACTIVE | Noted: 2022-02-05

## 2022-02-05 PROBLEM — E55.9 VITAMIN D DEFICIENCY: Status: ACTIVE | Noted: 2022-02-05

## 2022-02-05 RX ORDER — LEVOTHYROXINE SODIUM 200 UG/ML
225 SOLUTION ORAL DAILY
Qty: 30 ML | Refills: 1 | Status: SHIPPED | OUTPATIENT
Start: 2022-02-05 | End: 2022-02-09

## 2022-02-05 RX ORDER — ERGOCALCIFEROL 1.25 MG/1
50000 CAPSULE ORAL
Qty: 12 CAPSULE | Refills: 0 | Status: SHIPPED | OUTPATIENT
Start: 2022-02-05

## 2022-02-07 ENCOUNTER — TELEPHONE (OUTPATIENT)
Dept: INTERNAL MEDICINE CLINIC | Facility: CLINIC | Age: 32
End: 2022-02-07

## 2022-02-07 NOTE — TELEPHONE ENCOUNTER
Howard Sidhu with [facility] stated that they need a prior authorization for levothyroxine sodium solution Howard Sidhu also stated that the dosage and the directions do not match. Per Howard Sidhu they would need to alter the dosage and they would need a new prescription.      Howard Sidhu - [phone]

## 2022-02-07 NOTE — TELEPHONE ENCOUNTER
Please assist patient with scheduling VV for lab result discussion. TY! Silvano Salamanca MD   2/5/2022 12:49 PM CST Back to Top        Several abnormal labs that I would like to discuss with her.  Can schedule her for VV on Wendesday at 3pm.

## 2022-02-07 NOTE — TELEPHONE ENCOUNTER
Future Appointments   Date Time Provider Renea Murillo   2/9/2022  3:00 PM Kassandra Ricardo MD [facility] [place]   3/14/2022  8:30 AM Kassandra Ricardo MD [facility] [place]

## 2022-02-08 NOTE — TELEPHONE ENCOUNTER
Sig and dosage does not match per pharmacy staff.     PA has to be submitted through Jason Thomas. @ [phone]    Member ID # [id number]    Group: [id number]

## 2022-02-09 ENCOUNTER — TELEMEDICINE (OUTPATIENT)
Dept: INTERNAL MEDICINE CLINIC | Facility: CLINIC | Age: 32
End: 2022-02-09
Payer: COMMERCIAL

## 2022-02-09 DIAGNOSIS — E11.69 HYPERLIPIDEMIA ASSOCIATED WITH TYPE 2 DIABETES MELLITUS (HCC): ICD-10-CM

## 2022-02-09 DIAGNOSIS — E06.3 HASHIMOTO'S THYROIDITIS: ICD-10-CM

## 2022-02-09 DIAGNOSIS — E78.5 HYPERLIPIDEMIA ASSOCIATED WITH TYPE 2 DIABETES MELLITUS (HCC): ICD-10-CM

## 2022-02-09 DIAGNOSIS — E11.9 TYPE 2 DIABETES MELLITUS WITHOUT COMPLICATION, WITHOUT LONG-TERM CURRENT USE OF INSULIN (HCC): Primary | ICD-10-CM

## 2022-02-09 DIAGNOSIS — E55.9 VITAMIN D DEFICIENCY: ICD-10-CM

## 2022-02-09 DIAGNOSIS — D50.0 IRON DEFICIENCY ANEMIA DUE TO CHRONIC BLOOD LOSS: ICD-10-CM

## 2022-02-09 DIAGNOSIS — E53.8 FOLIC ACID DEFICIENCY: ICD-10-CM

## 2022-02-09 DIAGNOSIS — E53.8 B12 DEFICIENCY: ICD-10-CM

## 2022-02-09 PROCEDURE — 99214 OFFICE O/P EST MOD 30 MIN: CPT | Performed by: INTERNAL MEDICINE

## 2022-02-09 RX ORDER — CHOLECALCIFEROL (VITAMIN D3) 125 MCG
500 CAPSULE ORAL DAILY
COMMUNITY

## 2022-02-09 RX ORDER — LEVOTHYROXINE SODIUM 200 UG/ML
200 SOLUTION ORAL SEE ADMIN INSTRUCTIONS
Qty: 90 ML | Refills: 1 | Status: SHIPPED | OUTPATIENT
Start: 2022-02-09 | End: 2022-02-18

## 2022-02-09 RX ORDER — MAGNESIUM OXIDE 400 MG (241.3 MG MAGNESIUM) TABLET
800 TABLET DAILY
COMMUNITY

## 2022-02-09 RX ORDER — LEVOTHYROXINE SODIUM 25 UG/ML
25 SOLUTION ORAL SEE ADMIN INSTRUCTIONS
Qty: 30 ML | Refills: 1 | Status: SHIPPED | OUTPATIENT
Start: 2022-02-09 | End: 2022-02-18

## 2022-02-09 NOTE — PATIENT INSTRUCTIONS
Please start the metformin:  Week 1: 500 mg with breakfast  Week 2: 500 mg with breakfast and dinner  Week 3-4: 1000 mg with breakfast and dinner. Once you are tolerating the metformin well please reach out to me because I would then like to start you on ozempic or trulicity once per week to help you lose weight and improve your diabetes. Please repeat your labs in 6 weeks.

## 2022-02-14 ENCOUNTER — PATIENT MESSAGE (OUTPATIENT)
Dept: INTERNAL MEDICINE CLINIC | Facility: CLINIC | Age: 32
End: 2022-02-14

## 2022-02-14 NOTE — TELEPHONE ENCOUNTER
Incoming PA denial via fax from [address] [facility]    Reason: the request was denied because the request medication can be switched to the formulary alternative.        Formulary alternative are: Levothyroxine tab, Synthroid    PA denial ppw placed in [place]  In basket to be reviewed

## 2022-02-15 NOTE — TELEPHONE ENCOUNTER
Informed patient a denial letter was received from [facility] and it was placed in Dr. Beltran Carrillo basket to be reviewed once Dr. Beltran Carrillo comes back to the office ( 2/16/22) Patient verbalized understating. Awaiting on providers medical decision.

## 2022-02-17 NOTE — TELEPHONE ENCOUNTER
Attempted to call patient to discuss mychart message sent by Dr. Sharath Arellano 2/16/22 in order to move forward with appeal. Pt viewed message but has yet to respond. Left message for patient to call back.

## 2022-02-17 NOTE — TELEPHONE ENCOUNTER
Please make patient aware of my-chart message (sent on 2/16) so I can appeal insurance's decision.  TY

## 2022-02-18 RX ORDER — LEVOTHYROXINE SODIUM 25 MCG
25 TABLET ORAL SEE ADMIN INSTRUCTIONS
Qty: 30 TABLET | Refills: 1 | Status: SHIPPED | OUTPATIENT
Start: 2022-02-18

## 2022-02-18 RX ORDER — LEVOTHYROXINE SODIUM 200 MCG
200 TABLET ORAL SEE ADMIN INSTRUCTIONS
Qty: 30 TABLET | Refills: 1 | Status: SHIPPED | OUTPATIENT
Start: 2022-02-18

## 2022-02-21 NOTE — TELEPHONE ENCOUNTER
Patient responded to [address] [employer] message on 2/17/21     Synthroid 225mcg was sent to patient local pharmacy. Task completed.

## 2022-04-08 ENCOUNTER — LABORATORY ENCOUNTER (OUTPATIENT)
Dept: LAB | Age: 32
End: 2022-04-08
Attending: INTERNAL MEDICINE
Payer: COMMERCIAL

## 2022-04-08 ENCOUNTER — DIABETIC EDUCATION (OUTPATIENT)
Dept: ENDOCRINOLOGY CLINIC | Facility: CLINIC | Age: 32
End: 2022-04-08
Payer: COMMERCIAL

## 2022-04-08 DIAGNOSIS — D50.0 IRON DEFICIENCY ANEMIA DUE TO CHRONIC BLOOD LOSS: ICD-10-CM

## 2022-04-08 DIAGNOSIS — E11.69 HYPERLIPIDEMIA ASSOCIATED WITH TYPE 2 DIABETES MELLITUS (HCC): ICD-10-CM

## 2022-04-08 DIAGNOSIS — E78.5 HYPERLIPIDEMIA ASSOCIATED WITH TYPE 2 DIABETES MELLITUS (HCC): ICD-10-CM

## 2022-04-08 DIAGNOSIS — E11.65 TYPE 2 DIABETES MELLITUS WITH HYPERGLYCEMIA, WITHOUT LONG-TERM CURRENT USE OF INSULIN (HCC): Primary | ICD-10-CM

## 2022-04-08 DIAGNOSIS — E11.9 TYPE 2 DIABETES MELLITUS WITHOUT COMPLICATION, WITHOUT LONG-TERM CURRENT USE OF INSULIN (HCC): ICD-10-CM

## 2022-04-08 DIAGNOSIS — E55.9 VITAMIN D DEFICIENCY: ICD-10-CM

## 2022-04-08 DIAGNOSIS — E06.3 HASHIMOTO'S THYROIDITIS: ICD-10-CM

## 2022-04-08 LAB
BASOPHILS # BLD AUTO: 0.11 X10(3) UL (ref 0–0.2)
BASOPHILS NFR BLD AUTO: 1.2 %
DEPRECATED HBV CORE AB SER IA-ACNC: 15.1 NG/ML
EOSINOPHIL # BLD AUTO: 0.13 X10(3) UL (ref 0–0.7)
EOSINOPHIL NFR BLD AUTO: 1.4 %
ERYTHROCYTE [DISTWIDTH] IN BLOOD BY AUTOMATED COUNT: 14.4 %
FOLATE SERPL-MCNC: 12 NG/ML (ref 8.7–?)
HCT VFR BLD AUTO: 36.4 %
HGB BLD-MCNC: 10.7 G/DL
IMM GRANULOCYTES # BLD AUTO: 0.04 X10(3) UL (ref 0–1)
IMM GRANULOCYTES NFR BLD: 0.4 %
LYMPHOCYTES # BLD AUTO: 2.62 X10(3) UL (ref 1–4)
LYMPHOCYTES NFR BLD AUTO: 29 %
MCH RBC QN AUTO: 25.1 PG (ref 26–34)
MCHC RBC AUTO-ENTMCNC: 29.4 G/DL (ref 31–37)
MCV RBC AUTO: 85.4 FL
MONOCYTES # BLD AUTO: 0.87 X10(3) UL (ref 0.1–1)
MONOCYTES NFR BLD AUTO: 9.6 %
NEUTROPHILS # BLD AUTO: 5.27 X10 (3) UL (ref 1.5–7.7)
NEUTROPHILS # BLD AUTO: 5.27 X10(3) UL (ref 1.5–7.7)
NEUTROPHILS NFR BLD AUTO: 58.4 %
PLATELET # BLD AUTO: 410 10(3)UL (ref 150–450)
RBC # BLD AUTO: 4.26 X10(6)UL
T4 FREE SERPL-MCNC: 1 NG/DL (ref 0.8–1.7)
TSI SER-ACNC: 2.19 MIU/ML (ref 0.36–3.74)
VIT D+METAB SERPL-MCNC: 28.1 NG/ML (ref 30–100)
WBC # BLD AUTO: 9 X10(3) UL (ref 4–11)

## 2022-04-08 PROCEDURE — G0108 DIAB MANAGE TRN  PER INDIV: HCPCS | Performed by: DIETITIAN, REGISTERED

## 2022-04-08 PROCEDURE — 82306 VITAMIN D 25 HYDROXY: CPT

## 2022-04-08 PROCEDURE — 85025 COMPLETE CBC W/AUTO DIFF WBC: CPT

## 2022-04-08 PROCEDURE — 82746 ASSAY OF FOLIC ACID SERUM: CPT

## 2022-04-08 PROCEDURE — 36415 COLL VENOUS BLD VENIPUNCTURE: CPT

## 2022-04-08 PROCEDURE — 84439 ASSAY OF FREE THYROXINE: CPT

## 2022-04-08 PROCEDURE — 84443 ASSAY THYROID STIM HORMONE: CPT

## 2022-04-08 PROCEDURE — 82728 ASSAY OF FERRITIN: CPT

## 2022-04-08 NOTE — PROGRESS NOTES
Flower Doll   2/8/1990 was seen for Injection therapy diabetes:    4/8/2022  Referring Provider: Dr. Aurora Lovell  Start time: 1:30 End time: 2:00    HgbA1C (%)   Date Value   02/04/2022 6.8 (H)     Pt here to learn injections (has been taking Ozempic weekly x 5 weeks). Also answered her questions about nutrition and diabetes. Has already lost 8# and has noticed reduced appetite. Pt instructed on sub-cutaneous injection:    Reviewed cleaning the injection site and the top of the insulin pen, rotating injection sites, prime each dose, storage and expiration, sharps disposal, use new pen needle each time, don't store pen needle on pen. Reviewed HgbA1C and target A1c levels. Reviewed Type 2 diabetes as a diagnosis. Discussed insulin resistance and tools to treat: diet, regular physical activity, diabetes medications, and stress management. Also discussed natural progression of Type 2 diabetes and ways to slow progression: regular physical activity, good blood glucose control and avoid weight gain/possibly reduce weight. Taught label reading: focus on counting grams of carb rather than looking at sugar. Reviewed current diabetes medications: Ozempic starting 0.5 mg weekly this weekend    Discussed Rule of 15 for treating hypoglycemia. Discussed macronutrient balance: reduce starch, include lean protein and non-starchy vegetables, also fruit, yogurt and milk using food models. Gave examples of heart healthy, balanced meals with 30-45 grams of carbohydrate/meal.     Reviewed principles for heart healthy diet: reduced saturated fat, reduced sodium and high fiber. Exercise: Discussed benefits of regular physical activity and goal to complete 30 minutes daily. She is exercising 3 days/week plus walking her dog. Patient set diabetes self-management goals: continue eating healthy/exercise -> wt loss.     Patient is willing to make lifestyle changes to improve blood glucose control. Written materials provided for all topics covered. Patient verbalized understanding and has no further questions at this time. Thank you for the referral.  Follow-up plan: pcp  Patient to contact diabetes center for questions/concerns.   Matthew Bobo MS, RD, ANTHONY, TEEN

## 2022-04-09 ENCOUNTER — OFFICE VISIT (OUTPATIENT)
Dept: INTERNAL MEDICINE CLINIC | Facility: CLINIC | Age: 32
End: 2022-04-09
Payer: COMMERCIAL

## 2022-04-09 VITALS
TEMPERATURE: 99 F | SYSTOLIC BLOOD PRESSURE: 122 MMHG | HEIGHT: 60.75 IN | RESPIRATION RATE: 16 BRPM | HEART RATE: 96 BPM | BODY MASS INDEX: 45.29 KG/M2 | OXYGEN SATURATION: 100 % | WEIGHT: 236.81 LBS | DIASTOLIC BLOOD PRESSURE: 57 MMHG

## 2022-04-09 DIAGNOSIS — E11.69 HYPERLIPIDEMIA ASSOCIATED WITH TYPE 2 DIABETES MELLITUS (HCC): ICD-10-CM

## 2022-04-09 DIAGNOSIS — E06.3 HASHIMOTO'S THYROIDITIS: ICD-10-CM

## 2022-04-09 DIAGNOSIS — E11.9 TYPE 2 DIABETES MELLITUS WITHOUT COMPLICATION, WITHOUT LONG-TERM CURRENT USE OF INSULIN (HCC): ICD-10-CM

## 2022-04-09 DIAGNOSIS — E78.5 HYPERLIPIDEMIA ASSOCIATED WITH TYPE 2 DIABETES MELLITUS (HCC): ICD-10-CM

## 2022-04-09 DIAGNOSIS — Z00.00 ROUTINE GENERAL MEDICAL EXAMINATION AT A HEALTH CARE FACILITY: Primary | ICD-10-CM

## 2022-04-09 DIAGNOSIS — Z12.4 SCREENING FOR CERVICAL CANCER: ICD-10-CM

## 2022-04-09 PROCEDURE — 3078F DIAST BP <80 MM HG: CPT | Performed by: INTERNAL MEDICINE

## 2022-04-09 PROCEDURE — 3074F SYST BP LT 130 MM HG: CPT | Performed by: INTERNAL MEDICINE

## 2022-04-09 PROCEDURE — 3008F BODY MASS INDEX DOCD: CPT | Performed by: INTERNAL MEDICINE

## 2022-04-09 PROCEDURE — 88175 CYTOPATH C/V AUTO FLUID REDO: CPT | Performed by: INTERNAL MEDICINE

## 2022-04-09 PROCEDURE — 90471 IMMUNIZATION ADMIN: CPT | Performed by: INTERNAL MEDICINE

## 2022-04-09 PROCEDURE — 99395 PREV VISIT EST AGE 18-39: CPT | Performed by: INTERNAL MEDICINE

## 2022-04-09 PROCEDURE — 90715 TDAP VACCINE 7 YRS/> IM: CPT | Performed by: INTERNAL MEDICINE

## 2022-04-09 RX ORDER — LEVOTHYROXINE SODIUM 25 MCG
25 TABLET ORAL SEE ADMIN INSTRUCTIONS
Qty: 90 TABLET | Refills: 1 | Status: SHIPPED | OUTPATIENT
Start: 2022-04-09

## 2022-04-09 RX ORDER — LEVOTHYROXINE SODIUM 200 MCG
200 TABLET ORAL SEE ADMIN INSTRUCTIONS
Qty: 90 TABLET | Refills: 1 | Status: SHIPPED | OUTPATIENT
Start: 2022-04-09

## 2022-04-09 RX ORDER — MELATONIN
1000 DAILY
COMMUNITY

## 2022-04-09 RX ORDER — MAGNESIUM OXIDE 400 MG (241.3 MG MAGNESIUM) TABLET
800 TABLET DAILY
COMMUNITY

## 2022-04-09 RX ORDER — ERGOCALCIFEROL 1.25 MG/1
50000 CAPSULE ORAL
Qty: 12 CAPSULE | Refills: 3 | Status: SHIPPED | OUTPATIENT
Start: 2022-04-09

## 2022-04-09 RX ORDER — SEMAGLUTIDE 1.34 MG/ML
0.25 INJECTION, SOLUTION SUBCUTANEOUS
Qty: 1.5 ML | Refills: 3 | COMMUNITY
Start: 2022-04-09

## 2022-04-09 NOTE — PATIENT INSTRUCTIONS
Please repeat your labs and urine sample in early August. You do not need to fast.     Please complete your diabetic eye exam as soon as possible.

## 2022-08-11 ENCOUNTER — LAB ENCOUNTER (OUTPATIENT)
Dept: LAB | Age: 32
End: 2022-08-11
Attending: INTERNAL MEDICINE
Payer: COMMERCIAL

## 2022-08-11 DIAGNOSIS — E78.5 HYPERLIPIDEMIA ASSOCIATED WITH TYPE 2 DIABETES MELLITUS (HCC): ICD-10-CM

## 2022-08-11 DIAGNOSIS — E06.3 HASHIMOTO'S THYROIDITIS: ICD-10-CM

## 2022-08-11 DIAGNOSIS — E11.69 HYPERLIPIDEMIA ASSOCIATED WITH TYPE 2 DIABETES MELLITUS (HCC): ICD-10-CM

## 2022-08-11 DIAGNOSIS — E11.9 TYPE 2 DIABETES MELLITUS WITHOUT COMPLICATION, WITHOUT LONG-TERM CURRENT USE OF INSULIN (HCC): ICD-10-CM

## 2022-08-11 LAB
ALT SERPL-CCNC: 22 U/L
ANION GAP SERPL CALC-SCNC: 7 MMOL/L (ref 0–18)
AST SERPL-CCNC: 14 U/L (ref 15–37)
BASOPHILS # BLD AUTO: 0.09 X10(3) UL (ref 0–0.2)
BASOPHILS NFR BLD AUTO: 1.1 %
BUN BLD-MCNC: 11 MG/DL (ref 7–18)
BUN/CREAT SERPL: 10.5 (ref 10–20)
CALCIUM BLD-MCNC: 9.9 MG/DL (ref 8.5–10.1)
CHLORIDE SERPL-SCNC: 106 MMOL/L (ref 98–112)
CHOLEST SERPL-MCNC: 236 MG/DL (ref ?–200)
CO2 SERPL-SCNC: 25 MMOL/L (ref 21–32)
CREAT BLD-MCNC: 1.05 MG/DL
CREAT UR-SCNC: 176 MG/DL
DEPRECATED HBV CORE AB SER IA-ACNC: 13.5 NG/ML
DEPRECATED RDW RBC AUTO: 47.2 FL (ref 35.1–46.3)
EOSINOPHIL # BLD AUTO: 0.11 X10(3) UL (ref 0–0.7)
EOSINOPHIL NFR BLD AUTO: 1.4 %
ERYTHROCYTE [DISTWIDTH] IN BLOOD BY AUTOMATED COUNT: 16.7 % (ref 11–15)
EST. AVERAGE GLUCOSE BLD GHB EST-MCNC: 123 MG/DL (ref 68–126)
FASTING PATIENT LIPID ANSWER: YES
FASTING STATUS PATIENT QL REPORTED: YES
GFR SERPLBLD BASED ON 1.73 SQ M-ARVRAT: 72 ML/MIN/1.73M2 (ref 60–?)
GLUCOSE BLD-MCNC: 88 MG/DL (ref 70–99)
HBA1C MFR BLD: 5.9 % (ref ?–5.7)
HCT VFR BLD AUTO: 36.1 %
HDLC SERPL-MCNC: 51 MG/DL (ref 40–59)
HGB BLD-MCNC: 10.8 G/DL
IMM GRANULOCYTES # BLD AUTO: 0.04 X10(3) UL (ref 0–1)
IMM GRANULOCYTES NFR BLD: 0.5 %
LDLC SERPL CALC-MCNC: 166 MG/DL (ref ?–100)
LYMPHOCYTES # BLD AUTO: 2.21 X10(3) UL (ref 1–4)
LYMPHOCYTES NFR BLD AUTO: 27.9 %
MCH RBC QN AUTO: 23.5 PG (ref 26–34)
MCHC RBC AUTO-ENTMCNC: 29.9 G/DL (ref 31–37)
MCV RBC AUTO: 78.5 FL
MICROALBUMIN UR-MCNC: 1.28 MG/DL
MICROALBUMIN/CREAT 24H UR-RTO: 7.3 UG/MG (ref ?–30)
MONOCYTES # BLD AUTO: 0.64 X10(3) UL (ref 0.1–1)
MONOCYTES NFR BLD AUTO: 8.1 %
NEUTROPHILS # BLD AUTO: 4.83 X10 (3) UL (ref 1.5–7.7)
NEUTROPHILS # BLD AUTO: 4.83 X10(3) UL (ref 1.5–7.7)
NEUTROPHILS NFR BLD AUTO: 61 %
NONHDLC SERPL-MCNC: 185 MG/DL (ref ?–130)
OSMOLALITY SERPL CALC.SUM OF ELEC: 285 MOSM/KG (ref 275–295)
PLATELET # BLD AUTO: 418 10(3)UL (ref 150–450)
POTASSIUM SERPL-SCNC: 4.3 MMOL/L (ref 3.5–5.1)
RBC # BLD AUTO: 4.6 X10(6)UL
SODIUM SERPL-SCNC: 138 MMOL/L (ref 136–145)
T4 FREE SERPL-MCNC: 1.1 NG/DL (ref 0.8–1.7)
TRIGL SERPL-MCNC: 108 MG/DL (ref 30–149)
TSI SER-ACNC: 40.5 MIU/ML (ref 0.36–3.74)
VIT D+METAB SERPL-MCNC: 45.2 NG/ML (ref 30–100)
VLDLC SERPL CALC-MCNC: 21 MG/DL (ref 0–30)
WBC # BLD AUTO: 7.9 X10(3) UL (ref 4–11)

## 2022-08-11 PROCEDURE — 85025 COMPLETE CBC W/AUTO DIFF WBC: CPT

## 2022-08-11 PROCEDURE — 84439 ASSAY OF FREE THYROXINE: CPT

## 2022-08-11 PROCEDURE — 82043 UR ALBUMIN QUANTITATIVE: CPT

## 2022-08-11 PROCEDURE — 84443 ASSAY THYROID STIM HORMONE: CPT

## 2022-08-11 PROCEDURE — 36415 COLL VENOUS BLD VENIPUNCTURE: CPT

## 2022-08-11 PROCEDURE — 80061 LIPID PANEL: CPT

## 2022-08-11 PROCEDURE — 83036 HEMOGLOBIN GLYCOSYLATED A1C: CPT

## 2022-08-11 PROCEDURE — 82306 VITAMIN D 25 HYDROXY: CPT

## 2022-08-11 PROCEDURE — 82570 ASSAY OF URINE CREATININE: CPT

## 2022-08-11 PROCEDURE — 80048 BASIC METABOLIC PNL TOTAL CA: CPT

## 2022-08-11 PROCEDURE — 84450 TRANSFERASE (AST) (SGOT): CPT

## 2022-08-11 PROCEDURE — 3061F NEG MICROALBUMINURIA REV: CPT | Performed by: INTERNAL MEDICINE

## 2022-08-11 PROCEDURE — 84460 ALANINE AMINO (ALT) (SGPT): CPT

## 2022-08-11 PROCEDURE — 82728 ASSAY OF FERRITIN: CPT

## 2022-08-11 PROCEDURE — 3044F HG A1C LEVEL LT 7.0%: CPT | Performed by: INTERNAL MEDICINE

## 2022-08-17 ENCOUNTER — OFFICE VISIT (OUTPATIENT)
Dept: INTERNAL MEDICINE CLINIC | Facility: CLINIC | Age: 32
End: 2022-08-17
Payer: COMMERCIAL

## 2022-08-17 VITALS
SYSTOLIC BLOOD PRESSURE: 112 MMHG | HEIGHT: 60.75 IN | DIASTOLIC BLOOD PRESSURE: 80 MMHG | HEART RATE: 93 BPM | BODY MASS INDEX: 45.29 KG/M2 | WEIGHT: 236.81 LBS | TEMPERATURE: 98 F | RESPIRATION RATE: 16 BRPM | OXYGEN SATURATION: 99 %

## 2022-08-17 DIAGNOSIS — E11.9 TYPE 2 DIABETES MELLITUS WITHOUT COMPLICATION, WITHOUT LONG-TERM CURRENT USE OF INSULIN (HCC): ICD-10-CM

## 2022-08-17 DIAGNOSIS — Z51.81 THERAPEUTIC DRUG MONITORING: ICD-10-CM

## 2022-08-17 DIAGNOSIS — E06.3 HASHIMOTO'S THYROIDITIS: Primary | ICD-10-CM

## 2022-08-17 DIAGNOSIS — J45.20 MILD INTERMITTENT ASTHMA WITHOUT COMPLICATION: ICD-10-CM

## 2022-08-17 DIAGNOSIS — D50.0 IRON DEFICIENCY ANEMIA DUE TO CHRONIC BLOOD LOSS: ICD-10-CM

## 2022-08-17 DIAGNOSIS — E66.01 CLASS 3 SEVERE OBESITY DUE TO EXCESS CALORIES WITH SERIOUS COMORBIDITY AND BODY MASS INDEX (BMI) OF 45.0 TO 49.9 IN ADULT (HCC): ICD-10-CM

## 2022-08-17 PROCEDURE — 3008F BODY MASS INDEX DOCD: CPT | Performed by: INTERNAL MEDICINE

## 2022-08-17 PROCEDURE — 93000 ELECTROCARDIOGRAM COMPLETE: CPT | Performed by: INTERNAL MEDICINE

## 2022-08-17 PROCEDURE — 3074F SYST BP LT 130 MM HG: CPT | Performed by: INTERNAL MEDICINE

## 2022-08-17 PROCEDURE — 3079F DIAST BP 80-89 MM HG: CPT | Performed by: INTERNAL MEDICINE

## 2022-08-17 PROCEDURE — 99214 OFFICE O/P EST MOD 30 MIN: CPT | Performed by: INTERNAL MEDICINE

## 2022-08-17 RX ORDER — BUDESONIDE AND FORMOTEROL FUMARATE DIHYDRATE 160; 4.5 UG/1; UG/1
2 AEROSOL RESPIRATORY (INHALATION) 2 TIMES DAILY
Qty: 30.6 G | Refills: 3 | Status: SHIPPED | OUTPATIENT
Start: 2022-08-17

## 2022-09-02 ENCOUNTER — PATIENT MESSAGE (OUTPATIENT)
Dept: INTERNAL MEDICINE CLINIC | Facility: CLINIC | Age: 32
End: 2022-09-02

## 2022-09-02 NOTE — TELEPHONE ENCOUNTER
From: Jamia Brito  To: Uche Feng MD  Sent: 9/2/2022 12:19 PM CDT  Subject: Weight loss decision    Hi Dr. Michelle Lazo,     I thought about it and I would like to start taking the phentermine for my weight loss.     Thanks     Shady

## 2022-09-06 NOTE — TELEPHONE ENCOUNTER
From: Uche Feng MD  To: Jamia Brito  Sent: 9/2/2022 2:58 PM CDT  Subject: weight management    Dear Lb Ball,    I am more than happy to start you on phentermine. However, recently the guidelines for ozempic have changed and we can actually increase it up to 2 mg every week. This will also assist further with weight loss before we start a new agent. Let me know if you would like to first increase the ozempic to 2 mg and re-assess in 6 weeks to decide if you need to start phentermine.     Sincerely  Dr. Uche Feng

## 2022-12-07 ENCOUNTER — LAB ENCOUNTER (OUTPATIENT)
Dept: LAB | Age: 32
End: 2022-12-07
Attending: INTERNAL MEDICINE
Payer: COMMERCIAL

## 2022-12-07 DIAGNOSIS — E11.69 HYPERLIPIDEMIA ASSOCIATED WITH TYPE 2 DIABETES MELLITUS (HCC): ICD-10-CM

## 2022-12-07 DIAGNOSIS — D50.0 IRON DEFICIENCY ANEMIA DUE TO CHRONIC BLOOD LOSS: ICD-10-CM

## 2022-12-07 DIAGNOSIS — E11.9 TYPE 2 DIABETES MELLITUS WITHOUT COMPLICATION, WITHOUT LONG-TERM CURRENT USE OF INSULIN (HCC): ICD-10-CM

## 2022-12-07 DIAGNOSIS — E78.5 HYPERLIPIDEMIA ASSOCIATED WITH TYPE 2 DIABETES MELLITUS (HCC): ICD-10-CM

## 2022-12-07 DIAGNOSIS — E06.3 HASHIMOTO'S THYROIDITIS: ICD-10-CM

## 2022-12-07 LAB
BASOPHILS # BLD AUTO: 0.1 X10(3) UL (ref 0–0.2)
BASOPHILS NFR BLD AUTO: 1.1 %
CHOLEST SERPL-MCNC: 235 MG/DL (ref ?–200)
DEPRECATED HBV CORE AB SER IA-ACNC: 15.1 NG/ML
DEPRECATED RDW RBC AUTO: 48.3 FL (ref 35.1–46.3)
EOSINOPHIL # BLD AUTO: 0.09 X10(3) UL (ref 0–0.7)
EOSINOPHIL NFR BLD AUTO: 1 %
ERYTHROCYTE [DISTWIDTH] IN BLOOD BY AUTOMATED COUNT: 16.7 % (ref 11–15)
FASTING PATIENT LIPID ANSWER: NO
HCT VFR BLD AUTO: 36.8 %
HDLC SERPL-MCNC: 56 MG/DL (ref 40–59)
HGB BLD-MCNC: 11.2 G/DL
IMM GRANULOCYTES # BLD AUTO: 0.06 X10(3) UL (ref 0–1)
IMM GRANULOCYTES NFR BLD: 0.7 %
LDLC SERPL CALC-MCNC: 151 MG/DL (ref ?–100)
LYMPHOCYTES # BLD AUTO: 2.44 X10(3) UL (ref 1–4)
LYMPHOCYTES NFR BLD AUTO: 27.3 %
MCH RBC QN AUTO: 24.2 PG (ref 26–34)
MCHC RBC AUTO-ENTMCNC: 30.4 G/DL (ref 31–37)
MCV RBC AUTO: 79.5 FL
MONOCYTES # BLD AUTO: 0.83 X10(3) UL (ref 0.1–1)
MONOCYTES NFR BLD AUTO: 9.3 %
NEUTROPHILS # BLD AUTO: 5.41 X10 (3) UL (ref 1.5–7.7)
NEUTROPHILS # BLD AUTO: 5.41 X10(3) UL (ref 1.5–7.7)
NEUTROPHILS NFR BLD AUTO: 60.6 %
NONHDLC SERPL-MCNC: 179 MG/DL (ref ?–130)
PLATELET # BLD AUTO: 455 10(3)UL (ref 150–450)
RBC # BLD AUTO: 4.63 X10(6)UL
T4 FREE SERPL-MCNC: 1 NG/DL (ref 0.8–1.7)
TRIGL SERPL-MCNC: 156 MG/DL (ref 30–149)
TSI SER-ACNC: 28.7 MIU/ML (ref 0.36–3.74)
VLDLC SERPL CALC-MCNC: 30 MG/DL (ref 0–30)
WBC # BLD AUTO: 8.9 X10(3) UL (ref 4–11)

## 2022-12-07 PROCEDURE — 82728 ASSAY OF FERRITIN: CPT

## 2022-12-07 PROCEDURE — 85025 COMPLETE CBC W/AUTO DIFF WBC: CPT

## 2022-12-07 PROCEDURE — 80061 LIPID PANEL: CPT

## 2022-12-07 PROCEDURE — 84439 ASSAY OF FREE THYROXINE: CPT

## 2022-12-07 PROCEDURE — 84443 ASSAY THYROID STIM HORMONE: CPT

## 2022-12-07 PROCEDURE — 36415 COLL VENOUS BLD VENIPUNCTURE: CPT

## 2022-12-07 PROCEDURE — 83036 HEMOGLOBIN GLYCOSYLATED A1C: CPT

## 2022-12-09 ENCOUNTER — OFFICE VISIT (OUTPATIENT)
Dept: INTERNAL MEDICINE CLINIC | Facility: CLINIC | Age: 32
End: 2022-12-09
Payer: COMMERCIAL

## 2022-12-09 VITALS
BODY MASS INDEX: 44.56 KG/M2 | RESPIRATION RATE: 16 BRPM | WEIGHT: 233 LBS | OXYGEN SATURATION: 99 % | TEMPERATURE: 98 F | HEIGHT: 60.75 IN | SYSTOLIC BLOOD PRESSURE: 122 MMHG | HEART RATE: 100 BPM | DIASTOLIC BLOOD PRESSURE: 78 MMHG

## 2022-12-09 DIAGNOSIS — E11.69 HYPERLIPIDEMIA ASSOCIATED WITH TYPE 2 DIABETES MELLITUS (HCC): ICD-10-CM

## 2022-12-09 DIAGNOSIS — E11.9 TYPE 2 DIABETES MELLITUS WITHOUT COMPLICATION, WITHOUT LONG-TERM CURRENT USE OF INSULIN (HCC): ICD-10-CM

## 2022-12-09 DIAGNOSIS — E06.3 HASHIMOTO'S THYROIDITIS: ICD-10-CM

## 2022-12-09 DIAGNOSIS — G47.10 HYPERSOMNOLENCE: ICD-10-CM

## 2022-12-09 DIAGNOSIS — R06.83 SNORING: Primary | ICD-10-CM

## 2022-12-09 DIAGNOSIS — D50.0 IRON DEFICIENCY ANEMIA DUE TO CHRONIC BLOOD LOSS: ICD-10-CM

## 2022-12-09 DIAGNOSIS — E78.5 HYPERLIPIDEMIA ASSOCIATED WITH TYPE 2 DIABETES MELLITUS (HCC): ICD-10-CM

## 2022-12-09 LAB
EST. AVERAGE GLUCOSE BLD GHB EST-MCNC: 117 MG/DL (ref 68–126)
HBA1C MFR BLD: 5.7 % (ref ?–5.7)

## 2022-12-09 PROCEDURE — 90471 IMMUNIZATION ADMIN: CPT | Performed by: INTERNAL MEDICINE

## 2022-12-09 PROCEDURE — 3008F BODY MASS INDEX DOCD: CPT | Performed by: INTERNAL MEDICINE

## 2022-12-09 PROCEDURE — 3078F DIAST BP <80 MM HG: CPT | Performed by: INTERNAL MEDICINE

## 2022-12-09 PROCEDURE — 3074F SYST BP LT 130 MM HG: CPT | Performed by: INTERNAL MEDICINE

## 2022-12-09 PROCEDURE — 90686 IIV4 VACC NO PRSV 0.5 ML IM: CPT | Performed by: INTERNAL MEDICINE

## 2022-12-09 PROCEDURE — 90472 IMMUNIZATION ADMIN EACH ADD: CPT | Performed by: INTERNAL MEDICINE

## 2022-12-09 PROCEDURE — 99214 OFFICE O/P EST MOD 30 MIN: CPT | Performed by: INTERNAL MEDICINE

## 2022-12-09 PROCEDURE — 90677 PCV20 VACCINE IM: CPT | Performed by: INTERNAL MEDICINE

## 2022-12-09 RX ORDER — PHENTERMINE HYDROCHLORIDE 15 MG/1
15 CAPSULE ORAL SEE ADMIN INSTRUCTIONS
Qty: 7 CAPSULE | Refills: 0 | Status: SHIPPED | OUTPATIENT
Start: 2022-12-09

## 2022-12-09 RX ORDER — LEVOTHYROXINE SODIUM 75 MCG
75 TABLET ORAL
Qty: 90 TABLET | Refills: 1 | Status: SHIPPED | OUTPATIENT
Start: 2022-12-09

## 2022-12-09 RX ORDER — PHENTERMINE HYDROCHLORIDE 37.5 MG/1
37.5 TABLET ORAL
Qty: 30 TABLET | Refills: 1 | Status: SHIPPED | OUTPATIENT
Start: 2022-12-09

## 2022-12-09 RX ORDER — LEVOTHYROXINE SODIUM 200 MCG
200 TABLET ORAL SEE ADMIN INSTRUCTIONS
Qty: 90 TABLET | Refills: 1 | Status: SHIPPED | OUTPATIENT
Start: 2022-12-09

## 2022-12-09 NOTE — PATIENT INSTRUCTIONS
Please increase your synthroid to 275 mcg every day and repeat your TSH 2-3 days prior to your appt with Dr. Geneva Keene. Please follow your blood pressures as we start phentermine. Our goal is < 130/80. Please schedule a diabetic eye exam as soon as possible.

## 2023-01-10 ENCOUNTER — LAB ENCOUNTER (OUTPATIENT)
Dept: LAB | Age: 33
End: 2023-01-10
Attending: INTERNAL MEDICINE
Payer: COMMERCIAL

## 2023-01-10 DIAGNOSIS — E06.3 HASHIMOTO'S THYROIDITIS: ICD-10-CM

## 2023-01-10 LAB
T4 FREE SERPL-MCNC: 1.6 NG/DL (ref 0.8–1.7)
TSI SER-ACNC: 1.03 MIU/ML (ref 0.36–3.74)

## 2023-01-10 PROCEDURE — 84439 ASSAY OF FREE THYROXINE: CPT

## 2023-01-10 PROCEDURE — 36415 COLL VENOUS BLD VENIPUNCTURE: CPT

## 2023-01-10 PROCEDURE — 84443 ASSAY THYROID STIM HORMONE: CPT

## 2023-01-25 ENCOUNTER — LAB ENCOUNTER (OUTPATIENT)
Dept: LAB | Facility: HOSPITAL | Age: 33
End: 2023-01-25
Attending: INTERNAL MEDICINE
Payer: COMMERCIAL

## 2023-01-25 DIAGNOSIS — Z11.59 SCREENING FOR VIRAL DISEASE: ICD-10-CM

## 2023-01-25 DIAGNOSIS — Z01.818 PREOPERATIVE EXAMINATION, UNSPECIFIED: ICD-10-CM

## 2023-01-26 LAB — SARS-COV-2 RNA RESP QL NAA+PROBE: NOT DETECTED

## 2023-01-27 ENCOUNTER — TELEMEDICINE (OUTPATIENT)
Dept: INTERNAL MEDICINE CLINIC | Facility: CLINIC | Age: 33
End: 2023-01-27

## 2023-01-27 VITALS — BODY MASS INDEX: 43 KG/M2 | WEIGHT: 226 LBS

## 2023-01-27 DIAGNOSIS — E78.5 HYPERLIPIDEMIA ASSOCIATED WITH TYPE 2 DIABETES MELLITUS (HCC): Primary | ICD-10-CM

## 2023-01-27 DIAGNOSIS — D50.0 IRON DEFICIENCY ANEMIA DUE TO CHRONIC BLOOD LOSS: ICD-10-CM

## 2023-01-27 DIAGNOSIS — E11.9 TYPE 2 DIABETES MELLITUS WITHOUT COMPLICATION, WITHOUT LONG-TERM CURRENT USE OF INSULIN (HCC): ICD-10-CM

## 2023-01-27 DIAGNOSIS — J45.20 MILD INTERMITTENT ASTHMA WITHOUT COMPLICATION: ICD-10-CM

## 2023-01-27 DIAGNOSIS — E11.69 HYPERLIPIDEMIA ASSOCIATED WITH TYPE 2 DIABETES MELLITUS (HCC): Primary | ICD-10-CM

## 2023-01-27 DIAGNOSIS — E06.3 HASHIMOTO'S THYROIDITIS: ICD-10-CM

## 2023-01-27 RX ORDER — PHENTERMINE HYDROCHLORIDE 37.5 MG/1
37.5 TABLET ORAL
Qty: 90 TABLET | Refills: 1 | Status: SHIPPED | OUTPATIENT
Start: 2023-01-27

## 2023-01-27 NOTE — PATIENT INSTRUCTIONS
Please repeat your labs and urine testing in mid-late April, 2023.  Please see me 1 week later for your physical exam.

## 2023-01-28 ENCOUNTER — OFFICE VISIT (OUTPATIENT)
Dept: SLEEP CENTER | Age: 33
End: 2023-01-28
Attending: INTERNAL MEDICINE
Payer: COMMERCIAL

## 2023-01-28 DIAGNOSIS — G47.10 HYPERSOMNOLENCE: ICD-10-CM

## 2023-01-28 DIAGNOSIS — R06.83 SNORING: ICD-10-CM

## 2023-01-28 PROCEDURE — 95810 POLYSOM 6/> YRS 4/> PARAM: CPT

## 2023-01-30 NOTE — TELEPHONE ENCOUNTER
Protocol passed     Requesting: Albuterol Sulfate 108 (90 Base) MCG/ACT Inhalation Aerosol Powder, Breath Activated    LOV: 12/9/22   # Asthma - ACT done 2/2022. only uses symbicort once per day. Uses albuterol inhale rprior to exercise.     RTC: 6 weeks   Filled: historical   Recent Labs: 12/7/22     Upcoming OV: none scheduled

## 2023-02-06 ENCOUNTER — TELEPHONE (OUTPATIENT)
Dept: INTERNAL MEDICINE CLINIC | Facility: CLINIC | Age: 33
End: 2023-02-06

## 2023-02-06 DIAGNOSIS — J45.20 MILD INTERMITTENT ASTHMA WITHOUT COMPLICATION: Primary | ICD-10-CM

## 2023-02-06 RX ORDER — ALBUTEROL SULFATE 90 UG/1
2 AEROSOL, METERED RESPIRATORY (INHALATION) EVERY 4 HOURS PRN
Qty: 1 EACH | Refills: 2 | Status: SHIPPED | OUTPATIENT
Start: 2023-02-06

## 2023-02-06 NOTE — TELEPHONE ENCOUNTER
Blossom Sanchez at [facility] will not cover inhaler that was sent, she is requesting Albuterol HFA Inhaler instead

## 2023-02-21 RX ORDER — ALBUTEROL SULFATE 2.5 MG/3ML
2.5 SOLUTION RESPIRATORY (INHALATION) EVERY 4 HOURS PRN
Refills: 0 | OUTPATIENT
Start: 2023-02-21

## 2023-04-21 ENCOUNTER — LAB ENCOUNTER (OUTPATIENT)
Dept: LAB | Age: 33
End: 2023-04-21
Attending: INTERNAL MEDICINE
Payer: COMMERCIAL

## 2023-04-21 DIAGNOSIS — E06.3 HASHIMOTO'S THYROIDITIS: ICD-10-CM

## 2023-04-21 DIAGNOSIS — E78.5 HYPERLIPIDEMIA ASSOCIATED WITH TYPE 2 DIABETES MELLITUS (HCC): ICD-10-CM

## 2023-04-21 DIAGNOSIS — E11.69 HYPERLIPIDEMIA ASSOCIATED WITH TYPE 2 DIABETES MELLITUS (HCC): ICD-10-CM

## 2023-04-21 DIAGNOSIS — D50.0 IRON DEFICIENCY ANEMIA DUE TO CHRONIC BLOOD LOSS: ICD-10-CM

## 2023-04-21 LAB
ALT SERPL-CCNC: 35 U/L
ANION GAP SERPL CALC-SCNC: 5 MMOL/L (ref 0–18)
AST SERPL-CCNC: 24 U/L (ref 15–37)
BASOPHILS # BLD AUTO: 0.09 X10(3) UL (ref 0–0.2)
BASOPHILS NFR BLD AUTO: 1.2 %
BUN BLD-MCNC: 10 MG/DL (ref 7–18)
CALCIUM BLD-MCNC: 9.5 MG/DL (ref 8.5–10.1)
CHLORIDE SERPL-SCNC: 108 MMOL/L (ref 98–112)
CO2 SERPL-SCNC: 25 MMOL/L (ref 21–32)
CREAT BLD-MCNC: 0.99 MG/DL
CREAT UR-SCNC: 142 MG/DL
DEPRECATED HBV CORE AB SER IA-ACNC: 15.4 NG/ML
EOSINOPHIL # BLD AUTO: 0.11 X10(3) UL (ref 0–0.7)
EOSINOPHIL NFR BLD AUTO: 1.4 %
ERYTHROCYTE [DISTWIDTH] IN BLOOD BY AUTOMATED COUNT: 16.2 %
EST. AVERAGE GLUCOSE BLD GHB EST-MCNC: 126 MG/DL (ref 68–126)
FASTING STATUS PATIENT QL REPORTED: YES
GFR SERPLBLD BASED ON 1.73 SQ M-ARVRAT: 76 ML/MIN/1.73M2 (ref 60–?)
GLUCOSE BLD-MCNC: 115 MG/DL (ref 70–99)
HBA1C MFR BLD: 6 % (ref ?–5.7)
HCT VFR BLD AUTO: 37 %
HGB BLD-MCNC: 11 G/DL
IMM GRANULOCYTES # BLD AUTO: 0.02 X10(3) UL (ref 0–1)
IMM GRANULOCYTES NFR BLD: 0.3 %
LYMPHOCYTES # BLD AUTO: 1.77 X10(3) UL (ref 1–4)
LYMPHOCYTES NFR BLD AUTO: 23.1 %
MCH RBC QN AUTO: 22.4 PG (ref 26–34)
MCHC RBC AUTO-ENTMCNC: 29.7 G/DL (ref 31–37)
MCV RBC AUTO: 75.4 FL
MICROALBUMIN UR-MCNC: 0.83 MG/DL
MICROALBUMIN/CREAT 24H UR-RTO: 5.8 UG/MG (ref ?–30)
MONOCYTES # BLD AUTO: 0.6 X10(3) UL (ref 0.1–1)
MONOCYTES NFR BLD AUTO: 7.8 %
NEUTROPHILS # BLD AUTO: 5.07 X10 (3) UL (ref 1.5–7.7)
NEUTROPHILS # BLD AUTO: 5.07 X10(3) UL (ref 1.5–7.7)
NEUTROPHILS NFR BLD AUTO: 66.2 %
OSMOLALITY SERPL CALC.SUM OF ELEC: 286 MOSM/KG (ref 275–295)
PLATELET # BLD AUTO: 415 10(3)UL (ref 150–450)
POTASSIUM SERPL-SCNC: 4.3 MMOL/L (ref 3.5–5.1)
RBC # BLD AUTO: 4.91 X10(6)UL
SODIUM SERPL-SCNC: 138 MMOL/L (ref 136–145)
T4 FREE SERPL-MCNC: 1.6 NG/DL (ref 0.8–1.7)
TSI SER-ACNC: 0.01 MIU/ML (ref 0.36–3.74)
WBC # BLD AUTO: 7.7 X10(3) UL (ref 4–11)

## 2023-04-21 PROCEDURE — 80048 BASIC METABOLIC PNL TOTAL CA: CPT

## 2023-04-21 PROCEDURE — 3044F HG A1C LEVEL LT 7.0%: CPT | Performed by: INTERNAL MEDICINE

## 2023-04-21 PROCEDURE — 82570 ASSAY OF URINE CREATININE: CPT

## 2023-04-21 PROCEDURE — 83036 HEMOGLOBIN GLYCOSYLATED A1C: CPT

## 2023-04-21 PROCEDURE — 84443 ASSAY THYROID STIM HORMONE: CPT

## 2023-04-21 PROCEDURE — 82043 UR ALBUMIN QUANTITATIVE: CPT

## 2023-04-21 PROCEDURE — 36415 COLL VENOUS BLD VENIPUNCTURE: CPT

## 2023-04-21 PROCEDURE — 84439 ASSAY OF FREE THYROXINE: CPT

## 2023-04-21 PROCEDURE — 82728 ASSAY OF FERRITIN: CPT

## 2023-04-21 PROCEDURE — 85025 COMPLETE CBC W/AUTO DIFF WBC: CPT

## 2023-04-21 PROCEDURE — 3061F NEG MICROALBUMINURIA REV: CPT | Performed by: INTERNAL MEDICINE

## 2023-04-21 PROCEDURE — 84450 TRANSFERASE (AST) (SGOT): CPT

## 2023-04-21 PROCEDURE — 84460 ALANINE AMINO (ALT) (SGPT): CPT

## 2023-04-26 ENCOUNTER — OFFICE VISIT (OUTPATIENT)
Dept: INTERNAL MEDICINE CLINIC | Facility: CLINIC | Age: 33
End: 2023-04-26
Payer: COMMERCIAL

## 2023-04-26 VITALS
TEMPERATURE: 98 F | BODY MASS INDEX: 39.78 KG/M2 | DIASTOLIC BLOOD PRESSURE: 70 MMHG | WEIGHT: 208 LBS | OXYGEN SATURATION: 100 % | HEIGHT: 60.5 IN | RESPIRATION RATE: 16 BRPM | SYSTOLIC BLOOD PRESSURE: 110 MMHG | HEART RATE: 78 BPM

## 2023-04-26 DIAGNOSIS — Z00.00 ROUTINE GENERAL MEDICAL EXAMINATION AT A HEALTH CARE FACILITY: Primary | ICD-10-CM

## 2023-04-26 PROBLEM — G47.33 OSA (OBSTRUCTIVE SLEEP APNEA): Status: ACTIVE | Noted: 2023-02-23

## 2023-04-26 PROBLEM — D50.8 OTHER IRON DEFICIENCY ANEMIAS: Status: ACTIVE | Noted: 2023-04-26

## 2023-04-26 PROBLEM — E66.01 MORBID (SEVERE) OBESITY DUE TO EXCESS CALORIES (HCC): Status: ACTIVE | Noted: 2023-04-26

## 2023-04-26 PROCEDURE — 3078F DIAST BP <80 MM HG: CPT | Performed by: INTERNAL MEDICINE

## 2023-04-26 PROCEDURE — 99395 PREV VISIT EST AGE 18-39: CPT | Performed by: INTERNAL MEDICINE

## 2023-04-26 PROCEDURE — 3008F BODY MASS INDEX DOCD: CPT | Performed by: INTERNAL MEDICINE

## 2023-04-26 PROCEDURE — 3074F SYST BP LT 130 MM HG: CPT | Performed by: INTERNAL MEDICINE

## 2023-04-26 RX ORDER — SEMAGLUTIDE 1.34 MG/ML
1 INJECTION, SOLUTION SUBCUTANEOUS WEEKLY
Qty: 9 ML | Refills: 3 | Status: SHIPPED | OUTPATIENT
Start: 2023-04-26

## 2023-04-26 RX ORDER — SEMAGLUTIDE 1.34 MG/ML
1 INJECTION, SOLUTION SUBCUTANEOUS WEEKLY
COMMUNITY
Start: 2023-04-13 | End: 2023-04-26

## 2023-04-26 RX ORDER — LEVOTHYROXINE SODIUM 50 MCG
50 TABLET ORAL
Qty: 30 TABLET | Refills: 1 | Status: SHIPPED | OUTPATIENT
Start: 2023-04-26

## 2023-07-03 ENCOUNTER — LABORATORY ENCOUNTER (OUTPATIENT)
Dept: LAB | Age: 33
End: 2023-07-03
Attending: INTERNAL MEDICINE
Payer: COMMERCIAL

## 2023-07-03 DIAGNOSIS — Z00.00 ROUTINE GENERAL MEDICAL EXAMINATION AT A HEALTH CARE FACILITY: ICD-10-CM

## 2023-07-03 LAB
T4 FREE SERPL-MCNC: 1.1 NG/DL (ref 0.8–1.7)
TSI SER-ACNC: 0.57 MIU/ML (ref 0.36–3.74)

## 2023-07-03 PROCEDURE — 84439 ASSAY OF FREE THYROXINE: CPT

## 2023-07-03 PROCEDURE — 36415 COLL VENOUS BLD VENIPUNCTURE: CPT

## 2023-07-03 PROCEDURE — 84443 ASSAY THYROID STIM HORMONE: CPT

## 2023-08-05 DIAGNOSIS — E11.69 HYPERLIPIDEMIA ASSOCIATED WITH TYPE 2 DIABETES MELLITUS: ICD-10-CM

## 2023-08-05 DIAGNOSIS — E11.9 TYPE 2 DIABETES MELLITUS WITHOUT COMPLICATION, WITHOUT LONG-TERM CURRENT USE OF INSULIN (HCC): ICD-10-CM

## 2023-08-05 DIAGNOSIS — E78.5 HYPERLIPIDEMIA ASSOCIATED WITH TYPE 2 DIABETES MELLITUS: ICD-10-CM

## 2023-08-05 DIAGNOSIS — D50.0 IRON DEFICIENCY ANEMIA DUE TO CHRONIC BLOOD LOSS: ICD-10-CM

## 2023-08-05 DIAGNOSIS — E06.3 HASHIMOTO'S THYROIDITIS: ICD-10-CM

## 2023-08-05 DIAGNOSIS — E55.9 VITAMIN D DEFICIENCY: ICD-10-CM

## 2023-08-07 RX ORDER — ERGOCALCIFEROL 1.25 MG/1
50000 CAPSULE ORAL
Qty: 12 CAPSULE | Refills: 3 | OUTPATIENT
Start: 2023-08-07

## 2023-08-07 NOTE — TELEPHONE ENCOUNTER
Requested Prescriptions     Pending Prescriptions Disp Refills    ERGOCALCIFEROL 1.25 MG (21868 UT) Oral Cap [Pharmacy Med Name: VITAMIN D2 50,000IU (ERGO) CAP RX] 12 capsule 3     Sig: TAKE 1 CAPSULE BY MOUTH EVERY 7 DAYS     Last VIT D lab 8/11/22   \"Vit D levels are normal\"     She has lab work awaiting to be completed

## 2023-08-14 ENCOUNTER — NURSE TRIAGE (OUTPATIENT)
Dept: INTERNAL MEDICINE CLINIC | Facility: CLINIC | Age: 33
End: 2023-08-14

## 2023-08-14 ENCOUNTER — PATIENT MESSAGE (OUTPATIENT)
Dept: INTERNAL MEDICINE CLINIC | Facility: CLINIC | Age: 33
End: 2023-08-14

## 2023-08-14 RX ORDER — LEVOTHYROXINE SODIUM 50 MCG
50 TABLET ORAL
Qty: 90 TABLET | Refills: 3 | Status: SHIPPED | OUTPATIENT
Start: 2023-08-14

## 2023-08-14 RX ORDER — PHENTERMINE HYDROCHLORIDE 37.5 MG/1
37.5 TABLET ORAL
Qty: 90 TABLET | Refills: 0 | Status: SHIPPED | OUTPATIENT
Start: 2023-08-14

## 2023-08-14 NOTE — TELEPHONE ENCOUNTER
Spoke with patient via phone. Patient agrees to appointment- appointment scheduled for 8/15 at 12:30pm with LL. Patient confirms she took at home COVID test and results were negative.

## 2023-08-14 NOTE — TELEPHONE ENCOUNTER
Action Requested: Summary for Provider     []  Critical Lab, Recommendations Needed  [x] Need Additional Advice  []   FYI    []   Need Orders  [] Need Medications Sent to Pharmacy  []  Other     SUMMARY: Since 8/7 has had \"chest tightness\" and \"feels like it is more effort to breath\", but denies any breathing difficulty, able to carry on ADL's and work without problems. Has been using albuterol inhaler every 4 hours and nebulizer treatment 2x/day (am and pm) with \"minimal improvement in chest tightness\". Denies fever, cough, nasal congestion, wheezing or any additional symptoms. Has also been taking allergies medications and symbicort as directed, as well as OTC mucinex. States \"symptoms have not worsened in past week, but have also not gotten better\". Has not taken COVID test, but will test to check. KS-  Patient asking if you had any further recommendations for her or recommend OV (with LL?) to evaluate further.   LOV 4/26/23       Reason for call: Sick Call (Chest congestion/)  Onset: Data Unavailable                      Reason for Disposition   MILD asthma attack (e.g., no SOB at rest, mild SOB with walking, speaks normally in sentences, mild wheezing) and lasting > 24 hours on prescribed treatment    Additional Information   Negative: COVID-19 diagnosed or suspected (e.g., DNDCS-48 present in community, known COVID-19 exposure, positive test) AND has COVID symptoms (e.g., cough, fever)     Patient will do home COVID test today to check    Protocols used: Asthma Attack-A-OH

## 2023-08-14 NOTE — TELEPHONE ENCOUNTER
LOV: 4/26/2023 with Dr. Beltran Carrillo  RTC: 6 months  Last Relevant Labs: April/July 2023  Filled: 1/27/2023 (Phentermine 37.5 mg Tablet)    #90 with 1 refill  4/26/2023 (Synthroid 50 mcg)   #30 with 1 refill    Future Appointments   Date Time Provider Renea Murillo   10/17/2023 10:30 AM Prosper Bean MD [facility] [place]

## 2023-08-16 ENCOUNTER — OFFICE VISIT (OUTPATIENT)
Dept: INTERNAL MEDICINE CLINIC | Facility: CLINIC | Age: 33
End: 2023-08-16
Payer: COMMERCIAL

## 2023-08-16 VITALS
OXYGEN SATURATION: 100 % | TEMPERATURE: 98 F | HEIGHT: 60.75 IN | SYSTOLIC BLOOD PRESSURE: 104 MMHG | BODY MASS INDEX: 39.47 KG/M2 | DIASTOLIC BLOOD PRESSURE: 70 MMHG | RESPIRATION RATE: 16 BRPM | HEART RATE: 88 BPM | WEIGHT: 206.38 LBS

## 2023-08-16 DIAGNOSIS — J45.40 MODERATE PERSISTENT ASTHMA WITHOUT COMPLICATION: Primary | ICD-10-CM

## 2023-08-16 DIAGNOSIS — J30.9 ALLERGIC RHINITIS, UNSPECIFIED SEASONALITY, UNSPECIFIED TRIGGER: ICD-10-CM

## 2023-08-16 PROCEDURE — 3078F DIAST BP <80 MM HG: CPT | Performed by: PHYSICIAN ASSISTANT

## 2023-08-16 PROCEDURE — 99214 OFFICE O/P EST MOD 30 MIN: CPT | Performed by: PHYSICIAN ASSISTANT

## 2023-08-16 PROCEDURE — 3074F SYST BP LT 130 MM HG: CPT | Performed by: PHYSICIAN ASSISTANT

## 2023-08-16 PROCEDURE — 3008F BODY MASS INDEX DOCD: CPT | Performed by: PHYSICIAN ASSISTANT

## 2023-08-16 RX ORDER — MONTELUKAST SODIUM 10 MG/1
10 TABLET ORAL DAILY
Qty: 90 TABLET | Refills: 1 | Status: SHIPPED | OUTPATIENT
Start: 2023-08-16

## 2023-08-29 DIAGNOSIS — J45.20 MILD INTERMITTENT ASTHMA WITHOUT COMPLICATION: ICD-10-CM

## 2023-08-29 RX ORDER — ALBUTEROL SULFATE 90 UG/1
2 AEROSOL, METERED RESPIRATORY (INHALATION) EVERY 4 HOURS PRN
Qty: 6.7 G | Refills: 0 | Status: SHIPPED | OUTPATIENT
Start: 2023-08-29

## 2023-10-14 ENCOUNTER — LABORATORY ENCOUNTER (OUTPATIENT)
Dept: LAB | Age: 33
End: 2023-10-14
Attending: INTERNAL MEDICINE
Payer: COMMERCIAL

## 2023-10-14 DIAGNOSIS — E78.5 HYPERLIPIDEMIA ASSOCIATED WITH TYPE 2 DIABETES MELLITUS: ICD-10-CM

## 2023-10-14 DIAGNOSIS — E55.9 VITAMIN D DEFICIENCY: ICD-10-CM

## 2023-10-14 DIAGNOSIS — E06.3 HASHIMOTO'S THYROIDITIS: ICD-10-CM

## 2023-10-14 DIAGNOSIS — D50.0 IRON DEFICIENCY ANEMIA DUE TO CHRONIC BLOOD LOSS: ICD-10-CM

## 2023-10-14 DIAGNOSIS — E11.69 HYPERLIPIDEMIA ASSOCIATED WITH TYPE 2 DIABETES MELLITUS: ICD-10-CM

## 2023-10-14 LAB
ALT SERPL-CCNC: 30 U/L
ANION GAP SERPL CALC-SCNC: 4 MMOL/L (ref 0–18)
AST SERPL-CCNC: 18 U/L (ref 15–37)
BASOPHILS # BLD AUTO: 0.1 X10(3) UL (ref 0–0.2)
BASOPHILS NFR BLD AUTO: 1.2 %
BUN BLD-MCNC: 9 MG/DL (ref 7–18)
CALCIUM BLD-MCNC: 9.6 MG/DL (ref 8.5–10.1)
CHLORIDE SERPL-SCNC: 107 MMOL/L (ref 98–112)
CO2 SERPL-SCNC: 26 MMOL/L (ref 21–32)
CREAT BLD-MCNC: 1.3 MG/DL
DEPRECATED HBV CORE AB SER IA-ACNC: 12.2 NG/ML
EGFRCR SERPLBLD CKD-EPI 2021: 55 ML/MIN/1.73M2 (ref 60–?)
EOSINOPHIL # BLD AUTO: 0.08 X10(3) UL (ref 0–0.7)
EOSINOPHIL NFR BLD AUTO: 0.9 %
ERYTHROCYTE [DISTWIDTH] IN BLOOD BY AUTOMATED COUNT: 16.2 %
FASTING STATUS PATIENT QL REPORTED: YES
GLUCOSE BLD-MCNC: 95 MG/DL (ref 70–99)
HCT VFR BLD AUTO: 37.4 %
HGB BLD-MCNC: 11.6 G/DL
IMM GRANULOCYTES # BLD AUTO: 0.02 X10(3) UL (ref 0–1)
IMM GRANULOCYTES NFR BLD: 0.2 %
LYMPHOCYTES # BLD AUTO: 2.74 X10(3) UL (ref 1–4)
LYMPHOCYTES NFR BLD AUTO: 31.9 %
MCH RBC QN AUTO: 23.9 PG (ref 26–34)
MCHC RBC AUTO-ENTMCNC: 31 G/DL (ref 31–37)
MCV RBC AUTO: 77.1 FL
MONOCYTES # BLD AUTO: 0.59 X10(3) UL (ref 0.1–1)
MONOCYTES NFR BLD AUTO: 6.9 %
NEUTROPHILS # BLD AUTO: 5.06 X10 (3) UL (ref 1.5–7.7)
NEUTROPHILS # BLD AUTO: 5.06 X10(3) UL (ref 1.5–7.7)
NEUTROPHILS NFR BLD AUTO: 58.9 %
OSMOLALITY SERPL CALC.SUM OF ELEC: 282 MOSM/KG (ref 275–295)
PLATELET # BLD AUTO: 465 10(3)UL (ref 150–450)
POTASSIUM SERPL-SCNC: 4.8 MMOL/L (ref 3.5–5.1)
RBC # BLD AUTO: 4.85 X10(6)UL
SODIUM SERPL-SCNC: 137 MMOL/L (ref 136–145)
VIT D+METAB SERPL-MCNC: 51.2 NG/ML (ref 30–100)
WBC # BLD AUTO: 8.6 X10(3) UL (ref 4–11)

## 2023-10-14 PROCEDURE — 36415 COLL VENOUS BLD VENIPUNCTURE: CPT

## 2023-10-14 PROCEDURE — 82728 ASSAY OF FERRITIN: CPT

## 2023-10-14 PROCEDURE — 80048 BASIC METABOLIC PNL TOTAL CA: CPT

## 2023-10-14 PROCEDURE — 85025 COMPLETE CBC W/AUTO DIFF WBC: CPT

## 2023-10-14 PROCEDURE — 84460 ALANINE AMINO (ALT) (SGPT): CPT

## 2023-10-14 PROCEDURE — 3044F HG A1C LEVEL LT 7.0%: CPT | Performed by: INTERNAL MEDICINE

## 2023-10-14 PROCEDURE — 84443 ASSAY THYROID STIM HORMONE: CPT

## 2023-10-14 PROCEDURE — 84450 TRANSFERASE (AST) (SGOT): CPT

## 2023-10-14 PROCEDURE — 82306 VITAMIN D 25 HYDROXY: CPT

## 2023-10-14 PROCEDURE — 84439 ASSAY OF FREE THYROXINE: CPT

## 2023-10-14 PROCEDURE — 83036 HEMOGLOBIN GLYCOSYLATED A1C: CPT

## 2023-10-17 ENCOUNTER — OFFICE VISIT (OUTPATIENT)
Dept: INTERNAL MEDICINE CLINIC | Facility: CLINIC | Age: 33
End: 2023-10-17
Payer: COMMERCIAL

## 2023-10-17 VITALS
DIASTOLIC BLOOD PRESSURE: 70 MMHG | SYSTOLIC BLOOD PRESSURE: 128 MMHG | RESPIRATION RATE: 16 BRPM | WEIGHT: 205.19 LBS | HEART RATE: 100 BPM | BODY MASS INDEX: 39.24 KG/M2 | HEIGHT: 60.75 IN | TEMPERATURE: 99 F | OXYGEN SATURATION: 99 %

## 2023-10-17 DIAGNOSIS — E55.9 VITAMIN D DEFICIENCY: ICD-10-CM

## 2023-10-17 DIAGNOSIS — E06.3 HASHIMOTO'S THYROIDITIS: ICD-10-CM

## 2023-10-17 DIAGNOSIS — G47.33 OSA (OBSTRUCTIVE SLEEP APNEA): ICD-10-CM

## 2023-10-17 DIAGNOSIS — E11.69 HYPERLIPIDEMIA ASSOCIATED WITH TYPE 2 DIABETES MELLITUS: ICD-10-CM

## 2023-10-17 DIAGNOSIS — D50.0 IRON DEFICIENCY ANEMIA DUE TO CHRONIC BLOOD LOSS: ICD-10-CM

## 2023-10-17 DIAGNOSIS — E66.9 DIABETES MELLITUS TYPE 2 IN OBESE: ICD-10-CM

## 2023-10-17 DIAGNOSIS — E11.9 TYPE 2 DIABETES MELLITUS WITHOUT COMPLICATION, WITHOUT LONG-TERM CURRENT USE OF INSULIN (HCC): ICD-10-CM

## 2023-10-17 DIAGNOSIS — E78.5 HYPERLIPIDEMIA ASSOCIATED WITH TYPE 2 DIABETES MELLITUS: ICD-10-CM

## 2023-10-17 DIAGNOSIS — E11.69 DIABETES MELLITUS TYPE 2 IN OBESE: ICD-10-CM

## 2023-10-17 DIAGNOSIS — E06.3 HASHIMOTO'S THYROIDITIS: Primary | ICD-10-CM

## 2023-10-17 DIAGNOSIS — J45.20 MILD INTERMITTENT ASTHMA WITHOUT COMPLICATION: ICD-10-CM

## 2023-10-17 PROBLEM — E66.813 CLASS 3 SEVERE OBESITY DUE TO EXCESS CALORIES WITH SERIOUS COMORBIDITY AND BODY MASS INDEX (BMI) OF 45.0 TO 49.9 IN ADULT (HCC): Status: RESOLVED | Noted: 2018-10-15 | Resolved: 2023-10-17

## 2023-10-17 PROBLEM — E66.813 CLASS 3 SEVERE OBESITY DUE TO EXCESS CALORIES WITH SERIOUS COMORBIDITY AND BODY MASS INDEX (BMI) OF 45.0 TO 49.9 IN ADULT: Status: RESOLVED | Noted: 2018-10-15 | Resolved: 2023-10-17

## 2023-10-17 PROBLEM — E66.01 CLASS 3 SEVERE OBESITY DUE TO EXCESS CALORIES WITH SERIOUS COMORBIDITY AND BODY MASS INDEX (BMI) OF 45.0 TO 49.9 IN ADULT (HCC): Status: RESOLVED | Noted: 2018-10-15 | Resolved: 2023-10-17

## 2023-10-17 PROBLEM — D50.8 OTHER IRON DEFICIENCY ANEMIAS: Status: RESOLVED | Noted: 2023-04-26 | Resolved: 2023-10-17

## 2023-10-17 PROBLEM — E66.01 MORBID (SEVERE) OBESITY DUE TO EXCESS CALORIES (HCC): Status: RESOLVED | Noted: 2023-04-26 | Resolved: 2023-10-17

## 2023-10-17 LAB
HGBA1C: 5.7 %
T4 FREE SERPL-MCNC: 0.9 NG/DL (ref 0.8–1.7)
TSI SER-ACNC: 22.2 MIU/ML (ref 0.36–3.74)

## 2023-10-17 PROCEDURE — 3078F DIAST BP <80 MM HG: CPT | Performed by: INTERNAL MEDICINE

## 2023-10-17 PROCEDURE — 90686 IIV4 VACC NO PRSV 0.5 ML IM: CPT | Performed by: INTERNAL MEDICINE

## 2023-10-17 PROCEDURE — 90471 IMMUNIZATION ADMIN: CPT | Performed by: INTERNAL MEDICINE

## 2023-10-17 PROCEDURE — 3074F SYST BP LT 130 MM HG: CPT | Performed by: INTERNAL MEDICINE

## 2023-10-17 PROCEDURE — 99214 OFFICE O/P EST MOD 30 MIN: CPT | Performed by: INTERNAL MEDICINE

## 2023-10-17 PROCEDURE — 3008F BODY MASS INDEX DOCD: CPT | Performed by: INTERNAL MEDICINE

## 2023-10-17 RX ORDER — LEVOTHYROXINE SODIUM 75 MCG
75 TABLET ORAL
Qty: 90 TABLET | Refills: 0 | Status: SHIPPED | OUTPATIENT
Start: 2023-10-17

## 2023-10-17 RX ORDER — LEVOTHYROXINE SODIUM 200 MCG
200 TABLET ORAL SEE ADMIN INSTRUCTIONS
COMMUNITY
Start: 2023-10-17 | End: 2023-10-18

## 2023-10-17 NOTE — PATIENT INSTRUCTIONS
Please taking flonase and albuterol 15 minutes prior to bedtime. If this does not help/allow you to use cpap at night in 2 weeks then please add nightly allegra, zyrtec, or claritin to help with chest congestion. If none of this is helping in 1 month then let's send you to either pulmonary or ENT. If lifestyle changes are not improving the heartburn, you can use over the counter famotidine (pepcid) 20 mg twice daily every day or just as needed prior to meals you know will trigger symptoms. Please complete labs and urine testing in April, 2024.

## 2023-10-18 RX ORDER — LEVOTHYROXINE SODIUM 200 MCG
200 TABLET ORAL
Qty: 90 TABLET | Refills: 0 | Status: SHIPPED | OUTPATIENT
Start: 2023-10-18

## 2023-10-18 NOTE — TELEPHONE ENCOUNTER
LOV: 10/17/2023 with Dr. Ramos Reyes  RTC: 6 months  Last Relevant Labs: 10/14/2023  Filled: 12/9/2022   #90 with 1 refill    No future appointments.

## 2023-10-30 DIAGNOSIS — J45.20 MILD INTERMITTENT ASTHMA WITHOUT COMPLICATION: ICD-10-CM

## 2023-11-02 NOTE — TELEPHONE ENCOUNTER
LOV: 10/17/2023 with Dr. Eusebio Gallagher  RTC: 6 months  Last Relevant Labs: 10/14/2023  Filled: 8/29/2023    #6.7 g with 0 refills    No future appointments.

## 2023-11-03 RX ORDER — ALBUTEROL SULFATE 90 UG/1
2 AEROSOL, METERED RESPIRATORY (INHALATION) EVERY 4 HOURS PRN
Qty: 6.7 G | Refills: 0 | Status: SHIPPED | OUTPATIENT
Start: 2023-11-03

## 2023-11-17 RX ORDER — PHENTERMINE HYDROCHLORIDE 37.5 MG/1
37.5 TABLET ORAL
Qty: 90 TABLET | Refills: 0 | Status: SHIPPED | OUTPATIENT
Start: 2023-11-17

## 2023-12-16 ENCOUNTER — APPOINTMENT (OUTPATIENT)
Dept: FAMILY MEDICINE | Age: 33
End: 2023-12-16

## 2023-12-16 ENCOUNTER — TELEPHONE (OUTPATIENT)
Dept: FAMILY MEDICINE | Age: 33
End: 2023-12-16

## 2024-01-22 ENCOUNTER — LAB ENCOUNTER (OUTPATIENT)
Dept: LAB | Age: 34
End: 2024-01-22
Attending: INTERNAL MEDICINE
Payer: COMMERCIAL

## 2024-01-22 DIAGNOSIS — E06.3 HASHIMOTO'S THYROIDITIS: ICD-10-CM

## 2024-01-22 LAB
T4 FREE SERPL-MCNC: 1.3 NG/DL (ref 0.8–1.7)
TSI SER-ACNC: 0.55 MIU/ML (ref 0.36–3.74)

## 2024-01-22 PROCEDURE — 36415 COLL VENOUS BLD VENIPUNCTURE: CPT

## 2024-01-22 PROCEDURE — 84439 ASSAY OF FREE THYROXINE: CPT

## 2024-01-22 PROCEDURE — 84443 ASSAY THYROID STIM HORMONE: CPT

## 2024-01-23 DIAGNOSIS — D50.0 IRON DEFICIENCY ANEMIA DUE TO CHRONIC BLOOD LOSS: ICD-10-CM

## 2024-01-23 DIAGNOSIS — E55.9 VITAMIN D DEFICIENCY: ICD-10-CM

## 2024-01-23 DIAGNOSIS — E78.5 HYPERLIPIDEMIA ASSOCIATED WITH TYPE 2 DIABETES MELLITUS  (HCC): ICD-10-CM

## 2024-01-23 DIAGNOSIS — E11.69 HYPERLIPIDEMIA ASSOCIATED WITH TYPE 2 DIABETES MELLITUS  (HCC): ICD-10-CM

## 2024-01-23 DIAGNOSIS — E06.3 HASHIMOTO'S THYROIDITIS: ICD-10-CM

## 2024-01-23 DIAGNOSIS — E11.9 TYPE 2 DIABETES MELLITUS WITHOUT COMPLICATION, WITHOUT LONG-TERM CURRENT USE OF INSULIN (HCC): ICD-10-CM

## 2024-01-23 RX ORDER — ERGOCALCIFEROL 1.25 MG/1
50000 CAPSULE ORAL
Qty: 12 CAPSULE | Refills: 3 | Status: SHIPPED | OUTPATIENT
Start: 2024-01-23

## 2024-01-23 RX ORDER — BUDESONIDE AND FORMOTEROL FUMARATE DIHYDRATE 160; 4.5 UG/1; UG/1
2 AEROSOL RESPIRATORY (INHALATION) 2 TIMES DAILY
Qty: 30.6 G | Refills: 3 | Status: SHIPPED | OUTPATIENT
Start: 2024-01-23

## 2024-01-23 NOTE — TELEPHONE ENCOUNTER
ergocalciferol 1.25 MG (07994 UT) Oral Cap         Sig: Take 1 capsule (50,000 Units total) by mouth every 7 days.    Disp: 12 capsule    Refills: 3    Start: 1/23/2024    Class: Normal    For: Hashimoto's thyroiditis; Type 2 diabetes mellitus without complication, without long-term current use of insulin (HCC); Hyperlipidemia associated with type 2 diabetes mellitus  (HCC); Vitamin D deficiency; Iron deficiency anemia due to chronic blood loss    Last ordered: 1 year ago (4/9/2022) by Lola Jc MD    Patient comment: Need refills        Budesonide-Formoterol Fumarate 160-4.5 MCG/ACT Inhalation Aerosol         Sig: Inhale 2 puffs into the lungs 2 (two) times daily.    Disp: 30.6 g    Refills: 3    Start: 1/23/2024    Class: Normal    Non-formulary    Last ordered: 1 year ago (8/17/2022) by Lola Jc MD    Patient comment: Need refills    Asthma & COPD Medication Protocol Teknds8701/23/2024 07:54 AM    Asthma Action Score greater than or equal to 20    Appointment in past 6 or next 3 months    AAP/ACT given in last 12 months      To be filled at: [id number] [place] [address] AT [facility] [address], [phone], [phone]        LOV:  10/17/23  RTC:  6 months  Recent Labs: 10/14/23    Upcoming OV  5/1/24     no

## 2024-01-26 RX ORDER — FLUTICASONE PROPIONATE AND SALMETEROL 500; 50 UG/1; UG/1
1 POWDER RESPIRATORY (INHALATION) 2 TIMES DAILY
Qty: 180 EACH | Refills: 3 | Status: SHIPPED | OUTPATIENT
Start: 2024-01-26 | End: 2024-02-25

## 2024-02-27 RX ORDER — LEVOTHYROXINE SODIUM 75 MCG
75 TABLET ORAL
Qty: 90 TABLET | Refills: 3 | Status: SHIPPED | OUTPATIENT
Start: 2024-02-27

## 2024-02-27 RX ORDER — PHENTERMINE HYDROCHLORIDE 37.5 MG/1
37.5 TABLET ORAL
Qty: 90 TABLET | Refills: 0 | Status: SHIPPED | OUTPATIENT
Start: 2024-02-27

## 2024-02-27 NOTE — TELEPHONE ENCOUNTER
LOV:  10/17/2023  RTC:   6 months  Filled: 11/25/2023  Recent Labs:  10/14/2023    Upcoming OV  5/1/2024

## 2024-02-29 ENCOUNTER — TELEPHONE (OUTPATIENT)
Dept: INTERNAL MEDICINE CLINIC | Facility: CLINIC | Age: 34
End: 2024-02-29

## 2024-02-29 RX ORDER — PHENTERMINE HYDROCHLORIDE 37.5 MG/1
37.5 CAPSULE ORAL EVERY MORNING
Qty: 90 CAPSULE | Refills: 0 | Status: SHIPPED | OUTPATIENT
Start: 2024-02-29

## 2024-02-29 NOTE — TELEPHONE ENCOUNTER
Incoming fax from [facility]   Patients plan does not cover phentermine tablets   They prefer phentermine capsules   Rx pended

## 2024-04-01 ENCOUNTER — PATIENT MESSAGE (OUTPATIENT)
Dept: INTERNAL MEDICINE CLINIC | Facility: CLINIC | Age: 34
End: 2024-04-01

## 2024-04-02 NOTE — TELEPHONE ENCOUNTER
From: Zuly Starr  To: Lola Jc  Sent: 4/1/2024 10:49 AM CDT  Subject: Note from Doctor     Hi Dr Jc,     I was recently assigned to do jury duty in [facility] on April 15th. However, as I’ve taken more of a caregiver role with mom being a fall risk, I don’t want to be that far away in case something happens. Especially since I work from home and Lelo is working outside the house. I was wondering if you would be able to provide a note to excuse me from jury duty as mom’s health issues take priority.       They would need a letter from you that explains why you should be excused (if it is a permanent or temporary illness). You can fax it to ([phone]) or e-mail [email or web address]. Please include my name, summons date (April 15, 2024) and my participant number ([id number]).     Thank you so much,    Zuly Starr

## 2024-04-26 ENCOUNTER — LAB ENCOUNTER (OUTPATIENT)
Dept: LAB | Age: 34
End: 2024-04-26
Attending: INTERNAL MEDICINE
Payer: COMMERCIAL

## 2024-04-26 DIAGNOSIS — E11.69 TYPE 2 DIABETES MELLITUS WITH OBESITY (HCC): ICD-10-CM

## 2024-04-26 DIAGNOSIS — E11.69 HYPERLIPIDEMIA ASSOCIATED WITH TYPE 2 DIABETES MELLITUS (HCC): ICD-10-CM

## 2024-04-26 DIAGNOSIS — E55.9 VITAMIN D DEFICIENCY: ICD-10-CM

## 2024-04-26 DIAGNOSIS — E78.5 HYPERLIPIDEMIA ASSOCIATED WITH TYPE 2 DIABETES MELLITUS (HCC): ICD-10-CM

## 2024-04-26 DIAGNOSIS — J45.20 MILD INTERMITTENT ASTHMA WITHOUT COMPLICATION (HCC): ICD-10-CM

## 2024-04-26 DIAGNOSIS — G47.33 OSA (OBSTRUCTIVE SLEEP APNEA): ICD-10-CM

## 2024-04-26 DIAGNOSIS — E11.9 TYPE 2 DIABETES MELLITUS WITHOUT COMPLICATION, WITHOUT LONG-TERM CURRENT USE OF INSULIN (HCC): ICD-10-CM

## 2024-04-26 DIAGNOSIS — D50.0 IRON DEFICIENCY ANEMIA DUE TO CHRONIC BLOOD LOSS: ICD-10-CM

## 2024-04-26 DIAGNOSIS — E66.9 TYPE 2 DIABETES MELLITUS WITH OBESITY (HCC): ICD-10-CM

## 2024-04-26 DIAGNOSIS — E06.3 HASHIMOTO'S THYROIDITIS: ICD-10-CM

## 2024-04-26 LAB
ALT SERPL-CCNC: 27 U/L
ANION GAP SERPL CALC-SCNC: 4 MMOL/L (ref 0–18)
AST SERPL-CCNC: 18 U/L (ref 15–37)
BASOPHILS # BLD AUTO: 0.07 X10(3) UL (ref 0–0.2)
BASOPHILS NFR BLD AUTO: 0.7 %
BUN BLD-MCNC: 10 MG/DL (ref 9–23)
CALCIUM BLD-MCNC: 9.1 MG/DL (ref 8.5–10.1)
CHLORIDE SERPL-SCNC: 108 MMOL/L (ref 98–112)
CHOLEST SERPL-MCNC: 190 MG/DL (ref ?–200)
CO2 SERPL-SCNC: 26 MMOL/L (ref 21–32)
CREAT BLD-MCNC: 1.32 MG/DL
CREAT UR-SCNC: 207 MG/DL
EGFRCR SERPLBLD CKD-EPI 2021: 54 ML/MIN/1.73M2 (ref 60–?)
EOSINOPHIL # BLD AUTO: 0.1 X10(3) UL (ref 0–0.7)
EOSINOPHIL NFR BLD AUTO: 1.1 %
ERYTHROCYTE [DISTWIDTH] IN BLOOD BY AUTOMATED COUNT: 16.5 %
EST. AVERAGE GLUCOSE BLD GHB EST-MCNC: 123 MG/DL (ref 68–126)
FASTING PATIENT LIPID ANSWER: YES
FASTING STATUS PATIENT QL REPORTED: YES
GLUCOSE BLD-MCNC: 116 MG/DL (ref 70–99)
HBA1C MFR BLD: 5.9 % (ref ?–5.7)
HCT VFR BLD AUTO: 35.7 %
HDLC SERPL-MCNC: 63 MG/DL (ref 40–59)
HGB BLD-MCNC: 10.5 G/DL
IMM GRANULOCYTES # BLD AUTO: 0.04 X10(3) UL (ref 0–1)
IMM GRANULOCYTES NFR BLD: 0.4 %
LDLC SERPL CALC-MCNC: 114 MG/DL (ref ?–100)
LYMPHOCYTES # BLD AUTO: 2.87 X10(3) UL (ref 1–4)
LYMPHOCYTES NFR BLD AUTO: 30.4 %
MCH RBC QN AUTO: 23.1 PG (ref 26–34)
MCHC RBC AUTO-ENTMCNC: 29.4 G/DL (ref 31–37)
MCV RBC AUTO: 78.5 FL
MICROALBUMIN UR-MCNC: 0.7 MG/DL
MICROALBUMIN/CREAT 24H UR-RTO: 3.4 UG/MG (ref ?–30)
MONOCYTES # BLD AUTO: 0.8 X10(3) UL (ref 0.1–1)
MONOCYTES NFR BLD AUTO: 8.5 %
NEUTROPHILS # BLD AUTO: 5.57 X10 (3) UL (ref 1.5–7.7)
NEUTROPHILS # BLD AUTO: 5.57 X10(3) UL (ref 1.5–7.7)
NEUTROPHILS NFR BLD AUTO: 58.9 %
NONHDLC SERPL-MCNC: 127 MG/DL (ref ?–130)
OSMOLALITY SERPL CALC.SUM OF ELEC: 286 MOSM/KG (ref 275–295)
PLATELET # BLD AUTO: 438 10(3)UL (ref 150–450)
POTASSIUM SERPL-SCNC: 4.8 MMOL/L (ref 3.5–5.1)
RBC # BLD AUTO: 4.55 X10(6)UL
SODIUM SERPL-SCNC: 138 MMOL/L (ref 136–145)
T4 FREE SERPL-MCNC: 1.4 NG/DL (ref 0.8–1.7)
TRIGL SERPL-MCNC: 70 MG/DL (ref 30–149)
TSI SER-ACNC: 0.34 MIU/ML (ref 0.36–3.74)
VLDLC SERPL CALC-MCNC: 12 MG/DL (ref 0–30)
WBC # BLD AUTO: 9.5 X10(3) UL (ref 4–11)

## 2024-04-26 PROCEDURE — 80061 LIPID PANEL: CPT

## 2024-04-26 PROCEDURE — 82570 ASSAY OF URINE CREATININE: CPT

## 2024-04-26 PROCEDURE — 36415 COLL VENOUS BLD VENIPUNCTURE: CPT

## 2024-04-26 PROCEDURE — 84439 ASSAY OF FREE THYROXINE: CPT

## 2024-04-26 PROCEDURE — 80048 BASIC METABOLIC PNL TOTAL CA: CPT

## 2024-04-26 PROCEDURE — 84450 TRANSFERASE (AST) (SGOT): CPT

## 2024-04-26 PROCEDURE — 85025 COMPLETE CBC W/AUTO DIFF WBC: CPT

## 2024-04-26 PROCEDURE — 84443 ASSAY THYROID STIM HORMONE: CPT

## 2024-04-26 PROCEDURE — 84460 ALANINE AMINO (ALT) (SGPT): CPT

## 2024-04-26 PROCEDURE — 82043 UR ALBUMIN QUANTITATIVE: CPT

## 2024-04-26 PROCEDURE — 83036 HEMOGLOBIN GLYCOSYLATED A1C: CPT

## 2024-05-01 ENCOUNTER — OFFICE VISIT (OUTPATIENT)
Dept: INTERNAL MEDICINE CLINIC | Facility: CLINIC | Age: 34
End: 2024-05-01
Payer: COMMERCIAL

## 2024-05-01 VITALS
DIASTOLIC BLOOD PRESSURE: 90 MMHG | SYSTOLIC BLOOD PRESSURE: 100 MMHG | TEMPERATURE: 98 F | HEIGHT: 60.83 IN | OXYGEN SATURATION: 100 % | BODY MASS INDEX: 39.05 KG/M2 | WEIGHT: 206.81 LBS | HEART RATE: 100 BPM

## 2024-05-01 DIAGNOSIS — E06.3 HASHIMOTO'S THYROIDITIS: ICD-10-CM

## 2024-05-01 DIAGNOSIS — E11.9 TYPE 2 DIABETES MELLITUS WITHOUT COMPLICATION, WITHOUT LONG-TERM CURRENT USE OF INSULIN (HCC): ICD-10-CM

## 2024-05-01 DIAGNOSIS — E11.69 HYPERLIPIDEMIA ASSOCIATED WITH TYPE 2 DIABETES MELLITUS (HCC): ICD-10-CM

## 2024-05-01 DIAGNOSIS — D50.0 IRON DEFICIENCY ANEMIA DUE TO CHRONIC BLOOD LOSS: ICD-10-CM

## 2024-05-01 DIAGNOSIS — Z00.00 ROUTINE GENERAL MEDICAL EXAMINATION AT A HEALTH CARE FACILITY: Primary | ICD-10-CM

## 2024-05-01 DIAGNOSIS — E78.5 HYPERLIPIDEMIA ASSOCIATED WITH TYPE 2 DIABETES MELLITUS (HCC): ICD-10-CM

## 2024-05-01 PROCEDURE — 99395 PREV VISIT EST AGE 18-39: CPT | Performed by: INTERNAL MEDICINE

## 2024-05-01 RX ORDER — FLUTICASONE PROPIONATE AND SALMETEROL 500; 50 UG/1; UG/1
1 POWDER RESPIRATORY (INHALATION) 2 TIMES DAILY
COMMUNITY
Start: 2024-04-15

## 2024-05-01 RX ORDER — MONTELUKAST SODIUM 10 MG/1
10 TABLET ORAL DAILY
Qty: 90 TABLET | Refills: 3 | Status: SHIPPED | OUTPATIENT
Start: 2024-05-01

## 2024-05-01 RX ORDER — SEMAGLUTIDE 0.68 MG/ML
0.5 INJECTION, SOLUTION SUBCUTANEOUS WEEKLY
Qty: 9 ML | Refills: 3 | Status: SHIPPED | OUTPATIENT
Start: 2024-05-01

## 2024-05-01 RX ORDER — PHENTERMINE HYDROCHLORIDE 37.5 MG/1
37.5 CAPSULE ORAL EVERY MORNING
Qty: 90 CAPSULE | Refills: 3 | Status: SHIPPED | OUTPATIENT
Start: 2024-05-01

## 2024-05-01 NOTE — PATIENT INSTRUCTIONS
I recommend the following vaccines -  Annual FLU every September, October.    Stay up to date with COVID vaccines.     Please complete your diabetic eye exam as soon as possible.     Please repeat your TSH lab test in 6 weeks.     You should take the levothyroxine in the following way:  1. Take it first thing in the morning on an empty stomach with only water  2. Wait 30-60 minutes to eat or drink anything other than water or take any of your other prescriptions  3. You must wait at least 4 hours to take any vitamins or supplements because they will bind to the thyroid pill and make it more difficult to absorb into your bloodstream.

## 2024-05-01 NOTE — PROGRESS NOTES
HPI:   Patient presents for physical exam and additional issues noted below.  DM - very well controlled.   DM eye exam - she needs to schedule it.   Weight management - ozempic is causing a lot of nauseau and it is not improving. Edward has been on this dose for 6 months. The nauseau lasts 2-3 days. Her weight has plateuad but she is not upset about this. She has made many lifestyle changes and she is very pleased with herself. \"I feel good\". Phentermine is helping greatly with inutitive eating.   Hypothyroidism - TSH is very slightly low  REYNA - she is tolerating wearing the mask now.   Asthma  - she chagned to wixela from [place] a few weeks ago and she thinks it works better. She is feeling short of breath all the time. Even at rest. Her BYRNE has actually improved. She is not wheezing. No cough. Also feels chest tightness often and particularly int he morning. Chest tightness fluctuates with weather. Albuterol helps a little. Nebulizer helps more than MDI. She thinks this worsened over past 3 months.   Heartburn - persistent despite making many dietary changes. Triggering by natural cranberry juice. Pepcid does help.   Menorrhagia - her cycles are lighter flow and shorter in length.       REVIEW OF SYSTEMS:   GENERAL HEALTH: feels well otherwise. No f/c  NEURO: denies anyheadaches,  LH, dizzyness, LOC, falls  VISION: denies any blurred or double vision  RESPIRATORY: see HPI  CARDIOVASCULAR: denies chest pain, pressure or palpitations  GI: denies abdominal pain,  diarrhea, BRBPR, melena. Occ constipation.   : no dysuria  or hematuria  SKIN: denies any unusual skin lesions or rashes  PSYCH: mood is stable. Denies depression or anxiety.   EXT: denies edema          Wt Readings from Last 6 Encounters:   10/17/23 205 lb 3.2 oz (93.1 kg)   08/16/23 206 lb 6.4 oz (93.6 kg)   04/26/23 208 lb (94.3 kg)   01/27/23 226 lb (102.5 kg)   12/09/22 233 lb (105.7 kg)   08/17/22 236 lb 12.8 oz (107.4 kg)       Allergies    Allergen Reactions    No Known Allergies        Family History   Problem Relation Age of Onset    Hypertension Mother     Diabetes Mother     High Cholesterol Father     Breast Cancer Maternal Grandmother     Cancer Maternal Grandmother         Breast cancer    Diabetes Maternal Grandfather     Cancer Maternal Grandfather         Breast cancer    Diabetes Paternal Grandmother       Past Medical History:    Allergic rhinitis    Anemia    Asthma (HCC)    Diabetes (HCC)    Hypothyroidism    Obesity      Past Surgical History:   Procedure Laterality Date    Cholecystectomy  01/08/2020    Colonoscopy  2012    wnl      Social History:    Social History     Socioeconomic History    Marital status: Single   Tobacco Use    Smoking status: Never    Smokeless tobacco: Never   Vaping Use    Vaping status: Never Used   Substance and Sexual Activity    Alcohol use: Yes     Alcohol/week: 3.0 standard drinks of alcohol     Types: 3 Standard drinks or equivalent per week     Comment: 3 drinks/week    Drug use: No    Sexual activity: Never     Partners: Male   Other Topics Concern    Caffeine Concern No    Exercise No    Seat Belt No    Special Diet Yes    Stress Concern No    Weight Concern Yes     Social Determinants of Health      [facility]    Social Connections    Received from [facility], [facility]    Housing Stability           EXAM:   /90 (BP Location: Right arm)   Pulse 100   Temp 97.5 °F (36.4 °C) (Temporal)   Ht 5' 0.83\" (1.545 m)   Wt 206 lb 12.8 oz (93.8 kg)   LMP 04/11/2024 (Exact Date)   SpO2 100%   BMI 39.30 kg/m²   GENERAL: A&O well developed, well nourished,in no apparent distress  SKIN: no rashes,no suspicious lesions  HEENT: atraumatic, MMM, throat is clear  NECK: supple, no jvd, no thyromegaly  LUNGS: clear to auscultation bilateraly, no c/w/r  CARDIO: RRR without g/m/r  GI: obese, softly  distended, non-tender, no SHM, normal BS Throughout  NEURO: CN 2-12 grossly intact  PSYCH: pleasant  EXTREMITIES: no cyanosis, clubbing or edema  Bilateral barefoot skin diabetic exam is normal, visualized feet and the appearance is normal.  Bilateral monofilament/sensation of both feet is normal.  Pulsation pedal pulse exam of both lower legs/feet is normal as well.        ASSESSMENT AND PLAN:   # Heartburn and nauseau - due to ozempic, decrease dose. She is already practicing dietary interventions.   # Elevated BP - no history of HTN. She will start to monitor home pressures.   # REYNA - wearing cpap nightly   # Hashimoto's Hypothyroidism -  TSH is very slightly suppressed on synthroid 275 mcg. Keep same dose and repeat TSH in 6 weeks and then adjust if needed.   -  She has been on multiple different thyroid replacements because they were not absorbing correctly. Celiac testing was negative in past.   # Diabetes: At goal in 4/2024. Having s/e on ozempic 1 mg so decrease to 0.5 mg   - did not tolerate metformin.   - DM eye exam on 2/10/2023 with Dr. Yen negative for retinopathy in either eye. Reminded she is due.   - Foot Exam: done 2024   - LDL: see below  - BP: controlled without medication  - micro alb:creat < 30 in 2024   - Depression Screen: done 2024   - not on ASA  # Iron Deficiency Anemia - stable CBC on BID iron.   # Obesity, BMI 39 in DM - she has made excellent lifestyle changes. We will monitor her weight closely as we are decreasign ozempic to 0.5 mg (heartburn and nauseau on 1 mg). Cont phentermine   - starting weight 247 lbs in 2/2022.   - Continue healthy plant based nutrition, regular exercise, and practicing mindfulness.   - Took contrave for 1 year and it did not help.   # Folic Acid and B12 Deficiency: cont daily supplement  # Vitamin D Deficiency: continue weekly replacement.   # HLP: LDL improving with normalization of her thyroid function but still not at goal for DM. Monitor closely   #  Asthma - ACT done in 2024 and it is 16. Her symptoms are not well controlled but improving since starting wixela 2 weeks ago. Cont singulair. If she does not see continued imporvement in 6-8 weeks, refer to allergist.   # Health Maintenance: CPX on 5/1/2024   Indication for ASA (50-60 yo): no indication for ASA  Colon Cancer Screening: no first deg relatives. Screen at age 45  Cervical Cancer Screening: neg cytology in 4/2022. Repeat in 2025.   Breast Cancer Screening: no first deg relative. Discuss mammography at age 40.   Bone Health/Fall Prevention (Gonzalo Chi): educated on dietary calcium/vit D3, weight bearing exercises and fall prevention  Vaccines: TDAP 2023. Cont flu and COVID.   Hep C screening: Ab neg in 2022      The patient indicates understanding of these issues and agrees to the plan.  The patient is asked to return in 6 months for weight and DM.   Lola Jc MD

## 2024-05-31 ENCOUNTER — PATIENT MESSAGE (OUTPATIENT)
Dept: INTERNAL MEDICINE CLINIC | Facility: CLINIC | Age: 34
End: 2024-05-31

## 2024-05-31 NOTE — TELEPHONE ENCOUNTER
From: Zuly Starr  To: Lola Jc  Sent: 5/31/2024 7:21 AM CDT  Subject: BP and script change    Hi Dr Jc!     I wanted to send over my BP readings that I’ve taken:    5/16 (10am): 127/83  5/20 (8am) 121/85  5/22 (7:30am) 120/84  5/29 (8am) 121/85  5/30 (7am) 114/83    I’ll keep tracking my BP.    Also, can we move the phentermine 90day refill back to [facility]? It’s not covered by my insurance for mail order.    Thanks & have a great weekend!    Zuly

## 2024-06-03 RX ORDER — PHENTERMINE HYDROCHLORIDE 37.5 MG/1
37.5 CAPSULE ORAL EVERY MORNING
Qty: 90 CAPSULE | Refills: 3 | Status: SHIPPED | OUTPATIENT
Start: 2024-06-03

## 2024-06-15 ENCOUNTER — LAB ENCOUNTER (OUTPATIENT)
Dept: LAB | Age: 34
End: 2024-06-15
Attending: INTERNAL MEDICINE
Payer: COMMERCIAL

## 2024-06-15 DIAGNOSIS — E06.3 HASHIMOTO'S THYROIDITIS: ICD-10-CM

## 2024-06-15 LAB
T4 FREE SERPL-MCNC: 1.5 NG/DL (ref 0.8–1.7)
TSI SER-ACNC: 0.03 MIU/ML (ref 0.36–3.74)

## 2024-06-15 PROCEDURE — 84443 ASSAY THYROID STIM HORMONE: CPT

## 2024-06-15 PROCEDURE — 36415 COLL VENOUS BLD VENIPUNCTURE: CPT

## 2024-06-15 PROCEDURE — 84439 ASSAY OF FREE THYROXINE: CPT

## 2024-06-17 ENCOUNTER — PATIENT MESSAGE (OUTPATIENT)
Dept: INTERNAL MEDICINE CLINIC | Facility: CLINIC | Age: 34
End: 2024-06-17

## 2024-06-17 DIAGNOSIS — E11.69 HYPERLIPIDEMIA ASSOCIATED WITH TYPE 2 DIABETES MELLITUS (HCC): ICD-10-CM

## 2024-06-17 DIAGNOSIS — E78.5 HYPERLIPIDEMIA ASSOCIATED WITH TYPE 2 DIABETES MELLITUS (HCC): ICD-10-CM

## 2024-06-17 DIAGNOSIS — E11.9 TYPE 2 DIABETES MELLITUS WITHOUT COMPLICATION, WITHOUT LONG-TERM CURRENT USE OF INSULIN (HCC): ICD-10-CM

## 2024-06-17 DIAGNOSIS — D50.0 IRON DEFICIENCY ANEMIA DUE TO CHRONIC BLOOD LOSS: ICD-10-CM

## 2024-06-17 DIAGNOSIS — E06.3 HASHIMOTO'S THYROIDITIS: ICD-10-CM

## 2024-06-17 RX ORDER — LEVOTHYROXINE SODIUM 75 MCG
TABLET ORAL
COMMUNITY
Start: 2024-06-17

## 2024-06-17 RX ORDER — LEVOTHYROXINE SODIUM 200 MCG
TABLET ORAL
COMMUNITY
Start: 2024-06-17

## 2024-06-17 NOTE — TELEPHONE ENCOUNTER
From: Zuly Starr  To: Lola Jc  Sent: 6/17/2024 12:36 PM CDT  Subject: TSH/T4 bloodwork results    Hi Dr jc,     I still have some 50Mcg pills at home. I like the dosage plan.

## 2024-06-17 NOTE — TELEPHONE ENCOUNTER
See PARVIN Caruso,     Your thyroid function is more abnormal now. Thus, we should adjust your Synthroid dose. I recommend we continue synthroid 275 mcg five days per week and decrease to 250 mcg two days/week (Saturday and Sunday). Do you still have any 50 mcg tablets left?     Kind Regards,  Dr. Lola Jc   Written by Lola Jc MD on 6/15/2024  9:36 PM CDT  Seen by patient Zuly Fofanaam on 6/17/2024 12:35 PM

## 2024-06-20 DIAGNOSIS — E11.9 TYPE 2 DIABETES MELLITUS WITHOUT COMPLICATION, WITHOUT LONG-TERM CURRENT USE OF INSULIN (HCC): ICD-10-CM

## 2024-06-20 DIAGNOSIS — E06.3 HASHIMOTO'S THYROIDITIS: ICD-10-CM

## 2024-06-20 DIAGNOSIS — E78.5 HYPERLIPIDEMIA ASSOCIATED WITH TYPE 2 DIABETES MELLITUS (HCC): ICD-10-CM

## 2024-06-20 DIAGNOSIS — E11.69 HYPERLIPIDEMIA ASSOCIATED WITH TYPE 2 DIABETES MELLITUS (HCC): ICD-10-CM

## 2024-06-20 DIAGNOSIS — D50.0 IRON DEFICIENCY ANEMIA DUE TO CHRONIC BLOOD LOSS: ICD-10-CM

## 2024-06-21 RX ORDER — LEVOTHYROXINE SODIUM 200 MCG
TABLET ORAL
Qty: 30 TABLET | Refills: 3 | Status: SHIPPED | OUTPATIENT
Start: 2024-06-21

## 2024-06-21 RX ORDER — LEVOTHYROXINE SODIUM 200 MCG
TABLET ORAL
Qty: 10 TABLET | Refills: 3 | Status: SHIPPED | OUTPATIENT
Start: 2024-06-21 | End: 2024-06-21

## 2024-06-21 NOTE — TELEPHONE ENCOUNTER
SYNTHROID 200 MCG Oral Tab          Sig: Take 250 mcg po before breakfast on Saturday and Sunday    Disp: Not specified    Refills: 0    DANE    Start: 6/20/2024    Class: Normal    Non-formulary For: Hashimoto's thyroiditis; Iron deficiency anemia due to chronic blood loss; Type 2 diabetes mellitus without complication, without long-term current use of insulin (HCC); Hyperlipidemia associated with type 2 diabetes mellitus (HCC)    Last ordered: 4 days ago (6/17/2024) by Lola Jc MD    Thyroid Medication Protocol [id number]/20/2024 09:53 AM   Protocol Details Last TSH value is normal    TSH in past 12 months    In person appointment or virtual visit in the past 12 mos or appointment in next 3 mos      LOV 5/1/24  RTC 6 months  Last labs 6/15/24  Filled 6/17/24   No future appointments.

## 2024-06-21 NOTE — TELEPHONE ENCOUNTER
[facility]   # [phone]     Patients medication needs quantity of medication to reflect 30 tablets.      Per Pharmacy patient is taking 2 different dosages during week.

## 2024-06-21 NOTE — TELEPHONE ENCOUNTER
Called and spoke to the pharmacy, pharmacy states that patient needs prescription to reflect 30 quantity.  She states the 200 mcg is the baseline medication for the patient therefore she needs more.  She states she has a prescription for the 50mcg that she pairs it with and she also has a prescription for the 75mcg that she pairs it with as well.  Please advised okay to change to 30 tablets quantity?    Order pended. Please review and sign if appropriate. Thank you!   Please refill QTY and Refills

## 2024-07-05 NOTE — TELEPHONE ENCOUNTER
Yes, please proceed with PA. I am not sure how I need to adjust/clarify the instructions so proper dose is sent. [[id number]] [[id number]],[[id number]]

## 2024-08-16 ENCOUNTER — PATIENT MESSAGE (OUTPATIENT)
Dept: INTERNAL MEDICINE CLINIC | Facility: CLINIC | Age: 34
End: 2024-08-16

## 2024-08-19 NOTE — TELEPHONE ENCOUNTER
From: Zuly Starr  To: Lola Jc  Sent: 8/16/2024 5:09 PM CDT  Subject: Ozempic    Hi Dr. Jc,     I know we discussed lowering the Ozempic to see if the side effects would lessen. However, I’m still going through severe nausea with the 0.5mg injection. It’s not as severe as when I was on the 1, but it is still pretty bad.    Zuly

## 2024-08-26 DIAGNOSIS — E55.9 VITAMIN D DEFICIENCY: ICD-10-CM

## 2024-08-26 DIAGNOSIS — D50.0 IRON DEFICIENCY ANEMIA DUE TO CHRONIC BLOOD LOSS: ICD-10-CM

## 2024-08-26 DIAGNOSIS — E78.5 HYPERLIPIDEMIA ASSOCIATED WITH TYPE 2 DIABETES MELLITUS (HCC): ICD-10-CM

## 2024-08-26 DIAGNOSIS — J45.20 MILD INTERMITTENT ASTHMA WITHOUT COMPLICATION (HCC): ICD-10-CM

## 2024-08-26 DIAGNOSIS — E06.3 HASHIMOTO'S THYROIDITIS: ICD-10-CM

## 2024-08-26 DIAGNOSIS — E11.69 HYPERLIPIDEMIA ASSOCIATED WITH TYPE 2 DIABETES MELLITUS (HCC): ICD-10-CM

## 2024-08-26 DIAGNOSIS — E11.9 TYPE 2 DIABETES MELLITUS WITHOUT COMPLICATION, WITHOUT LONG-TERM CURRENT USE OF INSULIN (HCC): ICD-10-CM

## 2024-08-26 RX ORDER — SEMAGLUTIDE 0.68 MG/ML
0.25 INJECTION, SOLUTION SUBCUTANEOUS WEEKLY
Qty: 3 ML | Refills: 0 | Status: SHIPPED | OUTPATIENT
Start: 2024-08-26

## 2024-08-26 RX ORDER — ERGOCALCIFEROL 1.25 MG/1
50000 CAPSULE, LIQUID FILLED ORAL
Qty: 12 CAPSULE | Refills: 3 | OUTPATIENT
Start: 2024-08-26

## 2024-08-26 RX ORDER — ALBUTEROL SULFATE 90 UG/1
2 AEROSOL, METERED RESPIRATORY (INHALATION) EVERY 4 HOURS PRN
Qty: 6.7 G | Refills: 0 | OUTPATIENT
Start: 2024-08-26

## 2024-08-27 DIAGNOSIS — E06.3 HASHIMOTO'S THYROIDITIS: ICD-10-CM

## 2024-08-27 DIAGNOSIS — E78.5 HYPERLIPIDEMIA ASSOCIATED WITH TYPE 2 DIABETES MELLITUS (HCC): ICD-10-CM

## 2024-08-27 DIAGNOSIS — E11.9 TYPE 2 DIABETES MELLITUS WITHOUT COMPLICATION, WITHOUT LONG-TERM CURRENT USE OF INSULIN (HCC): ICD-10-CM

## 2024-08-27 DIAGNOSIS — E11.69 HYPERLIPIDEMIA ASSOCIATED WITH TYPE 2 DIABETES MELLITUS (HCC): ICD-10-CM

## 2024-08-27 DIAGNOSIS — E55.9 VITAMIN D DEFICIENCY: ICD-10-CM

## 2024-08-27 DIAGNOSIS — D50.0 IRON DEFICIENCY ANEMIA DUE TO CHRONIC BLOOD LOSS: ICD-10-CM

## 2024-08-27 DIAGNOSIS — J45.20 MILD INTERMITTENT ASTHMA WITHOUT COMPLICATION (HCC): ICD-10-CM

## 2024-08-27 RX ORDER — ALBUTEROL SULFATE 90 UG/1
2 AEROSOL, METERED RESPIRATORY (INHALATION) EVERY 4 HOURS PRN
Qty: 6.7 G | Refills: 0 | Status: SHIPPED | OUTPATIENT
Start: 2024-08-27

## 2024-08-27 RX ORDER — ERGOCALCIFEROL 1.25 MG/1
50000 CAPSULE, LIQUID FILLED ORAL
Qty: 12 CAPSULE | Refills: 3 | Status: SHIPPED | OUTPATIENT
Start: 2024-08-27

## 2024-08-27 NOTE — TELEPHONE ENCOUNTER
albuterol 108 (90 Base) MCG/ACT Inhalation Aero Soln           Possible duplicate: Hover to review recent actions on this medication    Sig: Inhale 2 puffs into the lungs every 4 (four) hours as needed for Wheezing or Shortness of Breath.    Disp: 6.7 g    Refills: 0    Start: 8/27/2024    Class: Normal    For: Mild intermittent asthma without complication (HCC)    Last ordered: 9 months ago (11/3/2023) by Lola Jc MD    Prior authorization request will be sent when the order is signed.    Asthma & COPD Medication Protocol [id number]/27/2024 08:42 AM   Protocol Details Asthma Action Score greater than or equal to 20    Appointment in past 6 or next 3 months    AAP/ACT given in last 12 months       ergocalciferol 1.25 MG (95151 UT) Oral Cap          Possible duplicate: Hover to review recent actions on this medication    Sig: Take 1 capsule (50,000 Units total) by mouth every 7 days.    Disp: 12 capsule    Refills: 3    Start: 8/27/2024    Class: Normal    For: Hashimoto's thyroiditis; Type 2 diabetes mellitus without complication, without long-term current use of insulin (HCC); Hyperlipidemia associated with type 2 diabetes mellitus (HCC); Vitamin D deficiency; Iron deficiency anemia due to chronic blood loss    Last ordered: 7 months ago (1/23/2024) by Lola Jc MD       To be filled at: [facility] #[id number] [place] [address] AT [facility] [address], [phone], [phone]       LOV:05/01/2024  RTC: 6 months  Labs:04/26/2024  Last filled: 06/20/2024    No future appointments.

## 2024-09-10 RX ORDER — MONTELUKAST SODIUM 10 MG/1
10 TABLET ORAL DAILY
Qty: 90 TABLET | Refills: 3 | Status: SHIPPED | OUTPATIENT
Start: 2024-09-10

## 2024-09-10 NOTE — TELEPHONE ENCOUNTER
montelukast (SINGULAIR) 10 MG Oral Tab         Sig: Take 1 tablet (10 mg total) by mouth daily.    Disp: 90 tablet    Refills: 3    Start: 9/10/2024    Class: Normal    Last ordered: 4 months ago (5/1/2024) by Lola Jc MD    Asthma & COPD Medication Protocol Eyzkrm86/10/2024 09:20 AM   Protocol Details Asthma Action Score greater than or equal to 20    Appointment in past 6 or next 3 months    AAP/ACT given in last 12 months      To be filled at: [facility] #[id number] [place] [address] AT [facility] [address], [phone], [phone]     LOV: 05/01/2024  RTC: 6 months  Labs:04/26/2024  Last filled: 05/01/2024    No future appointments.

## 2024-10-28 DIAGNOSIS — E78.5 HYPERLIPIDEMIA ASSOCIATED WITH TYPE 2 DIABETES MELLITUS (HCC): ICD-10-CM

## 2024-10-28 DIAGNOSIS — D50.0 IRON DEFICIENCY ANEMIA DUE TO CHRONIC BLOOD LOSS: ICD-10-CM

## 2024-10-28 DIAGNOSIS — E06.3 HASHIMOTO'S THYROIDITIS: ICD-10-CM

## 2024-10-28 DIAGNOSIS — E11.69 HYPERLIPIDEMIA ASSOCIATED WITH TYPE 2 DIABETES MELLITUS (HCC): ICD-10-CM

## 2024-10-28 DIAGNOSIS — E11.9 TYPE 2 DIABETES MELLITUS WITHOUT COMPLICATION, WITHOUT LONG-TERM CURRENT USE OF INSULIN (HCC): ICD-10-CM

## 2024-10-28 RX ORDER — LEVOTHYROXINE SODIUM 200 MCG
TABLET ORAL
Qty: 30 TABLET | Refills: 3 | Status: SHIPPED | OUTPATIENT
Start: 2024-10-28

## 2024-10-28 NOTE — TELEPHONE ENCOUNTER
SYNTHROID 200 MCG Oral Tab         Sig: Take 250 mcg po before breakfast on Saturday and Sunday    Disp: 30 tablet    Refills: 3    DANE    Start: 10/28/2024    Earliest Fill Date: 10/28/2024    Class: Normal    For: Hashimoto's thyroiditis; Iron deficiency anemia due to chronic blood loss; Type 2 diabetes mellitus without complication, without long-term current use of insulin (HCC); Hyperlipidemia associated with type 2 diabetes mellitus (HCC)    Last ordered: 4 months ago (6/21/2024) by Lola Jc MD    Thyroid Medication Protocol Qnivlr66/28/2024 10:40 AM   Protocol Details Last TSH value is normal    TSH in past 12 months    In person appointment or virtual visit in the past 12 mos or appointment in next 3 mos      To be filled at: [employer] [id number] [place], [place] - [facility] [address] AT [facility] [address], [phone], [phone]     LOV:05/01/2024  RTC:6 months   Labs: 06/15/2024  Last filled:07/08/2024   No future appointments.

## 2025-01-03 ENCOUNTER — PATIENT MESSAGE (OUTPATIENT)
Dept: INTERNAL MEDICINE CLINIC | Facility: CLINIC | Age: 35
End: 2025-01-03

## 2025-01-03 DIAGNOSIS — E78.5 HYPERLIPIDEMIA ASSOCIATED WITH TYPE 2 DIABETES MELLITUS (HCC): ICD-10-CM

## 2025-01-03 DIAGNOSIS — E11.69 HYPERLIPIDEMIA ASSOCIATED WITH TYPE 2 DIABETES MELLITUS (HCC): ICD-10-CM

## 2025-01-03 DIAGNOSIS — E11.9 TYPE 2 DIABETES MELLITUS WITHOUT COMPLICATION, WITHOUT LONG-TERM CURRENT USE OF INSULIN (HCC): Primary | ICD-10-CM

## 2025-01-03 DIAGNOSIS — E06.3 HASHIMOTO'S THYROIDITIS: ICD-10-CM

## 2025-01-03 DIAGNOSIS — D50.0 IRON DEFICIENCY ANEMIA DUE TO CHRONIC BLOOD LOSS: ICD-10-CM

## 2025-01-03 RX ORDER — PHENTERMINE HYDROCHLORIDE 37.5 MG/1
37.5 CAPSULE ORAL EVERY MORNING
Qty: 90 CAPSULE | Refills: 0 | OUTPATIENT
Start: 2025-01-03

## 2025-01-03 NOTE — TELEPHONE ENCOUNTER
Failed protocol     Last refill:         Phentermine HCl 37.5 MG Oral Cap 90 capsule 3 6/3/2024 --   Sig:   Take 1 capsule (37.5 mg total) by mouth every morning.       LOV:   05/01/2024 Dr Jc RTC 6 months  # Obesity, BMI 39 in DM - she has made excellent lifestyle changes. We will monitor her weight closely as we are decreasign ozempic to 0.5 mg (heartburn and nauseau on 1 mg). Cont phentermine   - starting weight 247 lbs in 2/2022.   - Continue healthy plant based nutrition, regular exercise, and practicing mindfulness.   - Took contrave for 1 year and it did not help.   No FOV scheduled

## 2025-01-06 RX ORDER — FLUTICASONE PROPIONATE AND SALMETEROL 500; 50 UG/1; UG/1
1 POWDER RESPIRATORY (INHALATION) 2 TIMES DAILY
Qty: 60 EACH | Refills: 0 | Status: SHIPPED | OUTPATIENT
Start: 2025-01-06

## 2025-01-06 RX ORDER — PHENTERMINE HYDROCHLORIDE 37.5 MG/1
37.5 CAPSULE ORAL EVERY MORNING
Qty: 90 CAPSULE | Refills: 3 | Status: SHIPPED | OUTPATIENT
Start: 2025-01-06

## 2025-01-06 NOTE — TELEPHONE ENCOUNTER
KS: Please see MCM below and advise regarding pre-appointment on 1/31. Do you want blood work done prior? TY    Future Appointments   Date Time Provider Department Center   1/31/2025  8:00 AM Lola Jc MD [facility] [place]

## 2025-01-25 ENCOUNTER — LAB ENCOUNTER (OUTPATIENT)
Dept: LAB | Age: 35
End: 2025-01-25
Attending: INTERNAL MEDICINE
Payer: COMMERCIAL

## 2025-01-25 DIAGNOSIS — E11.9 TYPE 2 DIABETES MELLITUS WITHOUT COMPLICATION, WITHOUT LONG-TERM CURRENT USE OF INSULIN (HCC): ICD-10-CM

## 2025-01-25 DIAGNOSIS — E06.3 HASHIMOTO'S THYROIDITIS: ICD-10-CM

## 2025-01-25 DIAGNOSIS — D50.0 IRON DEFICIENCY ANEMIA DUE TO CHRONIC BLOOD LOSS: ICD-10-CM

## 2025-01-25 DIAGNOSIS — E11.69 HYPERLIPIDEMIA ASSOCIATED WITH TYPE 2 DIABETES MELLITUS (HCC): ICD-10-CM

## 2025-01-25 DIAGNOSIS — E78.5 HYPERLIPIDEMIA ASSOCIATED WITH TYPE 2 DIABETES MELLITUS (HCC): ICD-10-CM

## 2025-01-25 LAB
ALT SERPL-CCNC: 13 U/L
ANION GAP SERPL CALC-SCNC: 10 MMOL/L (ref 0–18)
AST SERPL-CCNC: 18 U/L (ref ?–34)
BASOPHILS # BLD AUTO: 0.12 X10(3) UL (ref 0–0.2)
BASOPHILS NFR BLD AUTO: 1.7 %
BUN BLD-MCNC: 8 MG/DL (ref 9–23)
CALCIUM BLD-MCNC: 9.5 MG/DL (ref 8.7–10.6)
CHLORIDE SERPL-SCNC: 105 MMOL/L (ref 98–112)
CHOLEST SERPL-MCNC: 218 MG/DL (ref ?–200)
CO2 SERPL-SCNC: 24 MMOL/L (ref 21–32)
CREAT BLD-MCNC: 0.95 MG/DL
CREAT UR-SCNC: 177.2 MG/DL
DEPRECATED HBV CORE AB SER IA-ACNC: 11 NG/ML
EGFRCR SERPLBLD CKD-EPI 2021: 80 ML/MIN/1.73M2 (ref 60–?)
EOSINOPHIL # BLD AUTO: 0.16 X10(3) UL (ref 0–0.7)
EOSINOPHIL NFR BLD AUTO: 2.2 %
ERYTHROCYTE [DISTWIDTH] IN BLOOD BY AUTOMATED COUNT: 17.8 %
EST. AVERAGE GLUCOSE BLD GHB EST-MCNC: 111 MG/DL (ref 68–126)
FASTING PATIENT LIPID ANSWER: YES
FASTING STATUS PATIENT QL REPORTED: YES
GLUCOSE BLD-MCNC: 90 MG/DL (ref 70–99)
HBA1C MFR BLD: 5.5 % (ref ?–5.7)
HCT VFR BLD AUTO: 36.7 %
HDLC SERPL-MCNC: 50 MG/DL (ref 40–59)
HGB BLD-MCNC: 11.2 G/DL
IMM GRANULOCYTES # BLD AUTO: 0.02 X10(3) UL (ref 0–1)
IMM GRANULOCYTES NFR BLD: 0.3 %
LDLC SERPL CALC-MCNC: 153 MG/DL (ref ?–100)
LYMPHOCYTES # BLD AUTO: 2.52 X10(3) UL (ref 1–4)
LYMPHOCYTES NFR BLD AUTO: 35 %
MCH RBC QN AUTO: 23.8 PG (ref 26–34)
MCHC RBC AUTO-ENTMCNC: 30.5 G/DL (ref 31–37)
MCV RBC AUTO: 77.9 FL
MICROALBUMIN UR-MCNC: 0.6 MG/DL
MICROALBUMIN/CREAT 24H UR-RTO: 3.4 UG/MG (ref ?–30)
MONOCYTES # BLD AUTO: 0.68 X10(3) UL (ref 0.1–1)
MONOCYTES NFR BLD AUTO: 9.5 %
NEUTROPHILS # BLD AUTO: 3.69 X10 (3) UL (ref 1.5–7.7)
NEUTROPHILS # BLD AUTO: 3.69 X10(3) UL (ref 1.5–7.7)
NEUTROPHILS NFR BLD AUTO: 51.3 %
NONHDLC SERPL-MCNC: 168 MG/DL (ref ?–130)
OSMOLALITY SERPL CALC.SUM OF ELEC: 286 MOSM/KG (ref 275–295)
PLATELET # BLD AUTO: 460 10(3)UL (ref 150–450)
POTASSIUM SERPL-SCNC: 4.4 MMOL/L (ref 3.5–5.1)
RBC # BLD AUTO: 4.71 X10(6)UL
SODIUM SERPL-SCNC: 139 MMOL/L (ref 136–145)
T4 FREE SERPL-MCNC: 1.9 NG/DL (ref 0.8–1.7)
TRIGL SERPL-MCNC: 87 MG/DL (ref 30–149)
TSI SER-ACNC: 0.31 UIU/ML (ref 0.55–4.78)
VLDLC SERPL CALC-MCNC: 17 MG/DL (ref 0–30)
WBC # BLD AUTO: 7.2 X10(3) UL (ref 4–11)

## 2025-01-25 PROCEDURE — 84460 ALANINE AMINO (ALT) (SGPT): CPT

## 2025-01-25 PROCEDURE — 85025 COMPLETE CBC W/AUTO DIFF WBC: CPT

## 2025-01-25 PROCEDURE — 36415 COLL VENOUS BLD VENIPUNCTURE: CPT

## 2025-01-25 PROCEDURE — 82043 UR ALBUMIN QUANTITATIVE: CPT

## 2025-01-25 PROCEDURE — 84450 TRANSFERASE (AST) (SGOT): CPT

## 2025-01-25 PROCEDURE — 83036 HEMOGLOBIN GLYCOSYLATED A1C: CPT

## 2025-01-25 PROCEDURE — 82728 ASSAY OF FERRITIN: CPT

## 2025-01-25 PROCEDURE — 84439 ASSAY OF FREE THYROXINE: CPT

## 2025-01-25 PROCEDURE — 84443 ASSAY THYROID STIM HORMONE: CPT

## 2025-01-25 PROCEDURE — 80048 BASIC METABOLIC PNL TOTAL CA: CPT

## 2025-01-25 PROCEDURE — 80061 LIPID PANEL: CPT

## 2025-01-25 PROCEDURE — 82570 ASSAY OF URINE CREATININE: CPT

## 2025-01-31 ENCOUNTER — OFFICE VISIT (OUTPATIENT)
Dept: INTERNAL MEDICINE CLINIC | Facility: CLINIC | Age: 35
End: 2025-01-31
Payer: COMMERCIAL

## 2025-01-31 VITALS
TEMPERATURE: 98 F | BODY MASS INDEX: 40.39 KG/M2 | SYSTOLIC BLOOD PRESSURE: 110 MMHG | HEIGHT: 60.75 IN | OXYGEN SATURATION: 98 % | HEART RATE: 111 BPM | WEIGHT: 211.19 LBS | DIASTOLIC BLOOD PRESSURE: 74 MMHG

## 2025-01-31 DIAGNOSIS — D50.0 IRON DEFICIENCY ANEMIA DUE TO CHRONIC BLOOD LOSS: Primary | ICD-10-CM

## 2025-01-31 DIAGNOSIS — J45.20 MILD INTERMITTENT ASTHMA WITHOUT COMPLICATION (HCC): ICD-10-CM

## 2025-01-31 DIAGNOSIS — E11.9 TYPE 2 DIABETES MELLITUS WITHOUT COMPLICATION, WITHOUT LONG-TERM CURRENT USE OF INSULIN (HCC): ICD-10-CM

## 2025-01-31 DIAGNOSIS — E11.69 HYPERLIPIDEMIA ASSOCIATED WITH TYPE 2 DIABETES MELLITUS (HCC): ICD-10-CM

## 2025-01-31 DIAGNOSIS — E06.3 HASHIMOTO'S THYROIDITIS: ICD-10-CM

## 2025-01-31 DIAGNOSIS — E78.5 HYPERLIPIDEMIA ASSOCIATED WITH TYPE 2 DIABETES MELLITUS (HCC): ICD-10-CM

## 2025-01-31 PROCEDURE — 99214 OFFICE O/P EST MOD 30 MIN: CPT | Performed by: INTERNAL MEDICINE

## 2025-01-31 RX ORDER — ROSUVASTATIN CALCIUM 5 MG/1
5 TABLET, COATED ORAL DAILY
Qty: 90 TABLET | Refills: 1 | Status: SHIPPED | OUTPATIENT
Start: 2025-01-31

## 2025-01-31 RX ORDER — FLUTICASONE PROPIONATE AND SALMETEROL 500; 50 UG/1; UG/1
1 POWDER RESPIRATORY (INHALATION) 2 TIMES DAILY
Qty: 180 EACH | Refills: 3 | Status: SHIPPED | OUTPATIENT
Start: 2025-01-31

## 2025-01-31 NOTE — PROGRESS NOTES
HPI:   Patient presents for the following issues.   Weight - \"I feel good.\" She is exercising regularly and eating well. Phetnermine is a good fit for her but she is still nauseaus on ozempic despite dose and timing adjustments.   Hypothyroidism - recently adjusted dose for low TSH.   Mood - has a bit of work stress and she is trying to find a new job. She has reached out to counseling yesterday and has a consultation today. She dreads going to work. Home life is good, feels supported. Not affecting her sleep, interests, hobbies, eating habits. Denies SI/HI.   Iron deficiency anemia - taking daily oral iron replacement. She is still having monthly menses. They last 3 days and bleeding is lighter than before. It has been light for 7 months. Willing to see hem for iron infusion.   DM eye exam  - she has it re-scheduled.   HLP - she does not want ot have children.performed shared clinical decision-making.   Asthma - insurance no longer covering wixela. It has been very effective.   Tachycardia - she took albuterol inhaler prior to visit    REVIEW OF SYSTEMS:   GENERAL HEALTH: feels well otherwise. No f/c  NEURO: denies any LH, dizzyness, LOC, falls. She does have frequent headaches.   VISION: denies any blurred or double vision  RESPIRATORY: denies shortness of breath, cough, or congestion  CARDIOVASCULAR: denies chest pain, pressure or palpitations  GI: denies abdominal pain, diarrhea, n/v, BRBPR, melena. Has constipation from iron.   : no dysuria or hematuria  PSYCH: mood per above  EXT: denies edema        Wt Readings from Last 6 Encounters:   01/31/25 211 lb 3.2 oz (95.8 kg)   05/01/24 206 lb 12.8 oz (93.8 kg)   10/17/23 205 lb 3.2 oz (93.1 kg)   08/16/23 206 lb 6.4 oz (93.6 kg)   04/26/23 208 lb (94.3 kg)   01/27/23 226 lb (102.5 kg)       Allergies   Allergen Reactions    No Known Allergies        Family History   Problem Relation Age of Onset    Hypertension Mother     Diabetes Mother     High Cholesterol  Father     Breast Cancer Maternal Grandmother     Cancer Maternal Grandmother         Breast cancer    Diabetes Maternal Grandfather     Cancer Maternal Grandfather         Breast cancer    Diabetes Paternal Grandmother       Past Medical History:    Allergic rhinitis    Anemia    Asthma (HCC)    Diabetes (HCC)    Hypothyroidism    Obesity      Past Surgical History:   Procedure Laterality Date    Cholecystectomy  01/08/2020    Colonoscopy  2012    wnl      Social History:    Social History     Socioeconomic History    Marital status: Single   Tobacco Use    Smoking status: Never    Smokeless tobacco: Never   Vaping Use    Vaping status: Never Used   Substance and Sexual Activity    Alcohol use: Yes     Alcohol/week: 3.0 standard drinks of alcohol     Types: 3 Standard drinks or equivalent per week     Comment: 3 drinks/week    Drug use: No    Sexual activity: Never     Partners: Male   Other Topics Concern    Caffeine Concern No    Exercise No    Seat Belt No    Special Diet Yes    Stress Concern No    Weight Concern Yes     Social Drivers of Health     Food Insecurity: No Food Insecurity (1/31/2025)    NCSS - Food Insecurity     Worried About Running Out of Food in the Last Year: No     Ran Out of Food in the Last Year: No   Transportation Needs: No Transportation Needs (1/31/2025)    NCSS - Transportation     Lack of Transportation: No    [facility]    Social Connections   Housing Stability: Not At Risk (1/31/2025)    NCSS - Housing/Utilities     Has Housing: Yes     Worried About Losing Housing: No     Unable to Get Utilities: No             EXAM:   /74 (BP Location: Left arm, Patient Position: Sitting, Cuff Size: large)   Pulse 111   Temp 97.7 °F (36.5 °C) (Temporal)   Ht 5' 0.75\" (1.543 m)   Wt 211 lb 3.2 oz (95.8 kg)   LMP 01/17/2025 (Approximate)   SpO2 98%   BMI 40.24 kg/m²   GENERAL: A&O well developed, well nourished,in no  apparent distress  SKIN: no rashes,no suspicious lesions  HEENT: atraumatic, MMM, throat is clear  NECK: supple, no jvd, no thyromegaly  LUNGS: clear to auscultation bilateraly, no c/w/r  CARDIO: tachy, regular rhythm, without g/m/r  GI: softly distended, non-tender, no HSM, normal BS throughout  NEURO: CN 2-12 grossly intact  PSYCH: pleasant  EXTREMITIES: no cyanosis, clubbing or edema  Bilateral barefoot skin diabetic exam is normal, visualized feet and the appearance is normal.  Bilateral monofilament/sensation of both feet is normal.  Pulsation pedal pulse exam of both lower legs/feet is normal as well.          ASSESSMENT AND PLAN:   # REYNA - wearing cpap nightly   # Hashimoto's Hypothyroidism -  TSH still slightly suppressed. We have adjusted synthroid to 250 three days/week and 275 four days/week. Repeat TSH in May, 2025.   -  She has been on multiple different thyroid replacements because they were not absorbing correctly. Celiac testing was negative in past.   # Diabetes: At goal in January, 2025. We are stopping ozempic due to s/e. Repeat A1C in 3 months.   - did not tolerate metformin.   - DM eye exam on 2/10/2023 with Dr. Yen negative for retinopathy in either eye. She has it scheduled.   - Foot Exam: done 2025   - LDL: see below  - BP: controlled without medication  - micro alb:creat < 30 in 2025  - Depression Screen: done 2025  - not on ASA  # Iron Deficiency Anemia - ferritin remains low despite lighter menses and daily oral iron replacement. Referred to heme for IV iron.  # Obesity, BMI 40 in DM - she has made and continues to make excellent lifestyle changes. Cont phentermine, stop ozempic for s/e and monitor.   - starting weight 247 lbs in 2/2022.   - Continue healthy plant based nutrition, regular exercise, and practicing mindfulness.   - Took contrave for 1 year and it did not help. Ozepmic (0.25 mg to 1 mg dose made her too nauseaus)  # Folic Acid and B12 Deficiency: cont daily  supplement  # Vitamin D Deficiency: continue weekly replacement.   # HLP assoc with DM - start rosuvastatin. She is not planning on pregnancy.   # Asthma - ACT done in 2025  # Health Maintenance: CPX on 5/1/2024   Indication for ASA (50-60 yo): no indication for ASA  Colon Cancer Screening: no first deg relatives. Screen at age 45  Cervical Cancer Screening: neg cytology in 4/2022. Repeat in 2025.   Breast Cancer Screening: no first deg relative. Discuss mammography at age 40.   Bone Health/Fall Prevention (Gonzalo Chi): educated on dietary calcium/vit D3, weight bearing exercises and fall prevention  Vaccines: TDAP 2023. Cont flu and COVID.   Hep C screening: Ab neg in 2022      The patient indicates understanding of these issues and agrees to the plan.  The patient is asked to return in May, 2025 for wellness exam and pap smear.   Lola Jc MD

## 2025-01-31 NOTE — PATIENT INSTRUCTIONS
Please complete your labs in 3 months. You do not need to fast. Please make sure you see me a few weeks after your labs and you will be due for your pap smear so try to come when you are not on your menses.     Please stop ozempic.    Please start rosuvastatin for your cholesterol.

## 2025-03-17 ENCOUNTER — TELEPHONE (OUTPATIENT)
Age: 35
End: 2025-03-17

## 2025-03-17 NOTE — TELEPHONE ENCOUNTER
New Consult  Zuly Starr [birth year]  Referred by Dr. Lola Jc [phone]  Referred to Dr. Haddad  Iron deficiency anemia

## 2025-04-14 ENCOUNTER — OFFICE VISIT (OUTPATIENT)
Age: 35
End: 2025-04-14
Attending: INTERNAL MEDICINE
Payer: COMMERCIAL

## 2025-04-14 ENCOUNTER — TELEPHONE (OUTPATIENT)
Dept: INTERNAL MEDICINE CLINIC | Facility: CLINIC | Age: 35
End: 2025-04-14

## 2025-04-14 VITALS
SYSTOLIC BLOOD PRESSURE: 130 MMHG | BODY MASS INDEX: 41.8 KG/M2 | DIASTOLIC BLOOD PRESSURE: 86 MMHG | HEIGHT: 61 IN | TEMPERATURE: 98 F | WEIGHT: 221.38 LBS | RESPIRATION RATE: 16 BRPM | HEART RATE: 107 BPM | OXYGEN SATURATION: 99 %

## 2025-04-14 DIAGNOSIS — K90.89 POOR IRON ABSORPTION (HCC): ICD-10-CM

## 2025-04-14 DIAGNOSIS — D50.0 IRON DEFICIENCY ANEMIA DUE TO CHRONIC BLOOD LOSS: Primary | ICD-10-CM

## 2025-04-14 DIAGNOSIS — D75.839 THROMBOCYTOSIS: ICD-10-CM

## 2025-04-14 LAB
BASOPHILS # BLD AUTO: 0.11 X10(3) UL (ref 0–0.2)
BASOPHILS NFR BLD AUTO: 1.1 %
DEPRECATED HBV CORE AB SER IA-ACNC: 7 NG/ML (ref 50–306)
EOSINOPHIL # BLD AUTO: 0.12 X10(3) UL (ref 0–0.7)
EOSINOPHIL NFR BLD AUTO: 1.3 %
ERYTHROCYTE [DISTWIDTH] IN BLOOD BY AUTOMATED COUNT: 15.2 %
FOLATE SERPL-MCNC: 12.9 NG/ML (ref 5.4–?)
HCT VFR BLD AUTO: 35.8 % (ref 35–48)
HGB BLD-MCNC: 11.1 G/DL (ref 12–16)
IMM GRANULOCYTES # BLD AUTO: 0.03 X10(3) UL (ref 0–1)
IMM GRANULOCYTES NFR BLD: 0.3 %
IRON SATN MFR SERPL: 7 % (ref 15–50)
IRON SERPL-MCNC: 31 UG/DL (ref 50–170)
LYMPHOCYTES # BLD AUTO: 3.01 X10(3) UL (ref 1–4)
LYMPHOCYTES NFR BLD AUTO: 31.4 %
MCH RBC QN AUTO: 22.7 PG (ref 26–34)
MCHC RBC AUTO-ENTMCNC: 31 G/DL (ref 31–37)
MCV RBC AUTO: 73.2 FL (ref 80–100)
MONOCYTES # BLD AUTO: 0.95 X10(3) UL (ref 0.1–1)
MONOCYTES NFR BLD AUTO: 9.9 %
NEUTROPHILS # BLD AUTO: 5.38 X10 (3) UL (ref 1.5–7.7)
NEUTROPHILS # BLD AUTO: 5.38 X10(3) UL (ref 1.5–7.7)
NEUTROPHILS NFR BLD AUTO: 56 %
PLATELET # BLD AUTO: 451 10(3)UL (ref 150–450)
RBC # BLD AUTO: 4.89 X10(6)UL (ref 3.8–5.3)
TOTAL IRON BINDING CAPACITY: 418 UG/DL (ref 250–425)
TRANSFERRIN SERPL-MCNC: 345 MG/DL (ref 250–380)
VIT B12 SERPL-MCNC: >2000 PG/ML (ref 211–911)
WBC # BLD AUTO: 9.6 X10(3) UL (ref 4–11)

## 2025-04-14 NOTE — CONSULTS
Cancer Center Report of Consultation    Patient Name: Zuly Starr   YOB: 1990   Medical Record Number: [id number]   CSN: [id number]   Consulting Physician: Janeth Haddad MD  Referring Physician(s): Lola Jc MD      Date of Consultation: 4/14/2025     Reason for Consultation:  Zuly Starr was seen today in the Cancer Center for the diagnosis of anemia    Chief Complaint:   Chief Complaint   Patient presents with    Consult     Pt here for evaluation of recent labs. Energy is low, denies SOB. Diet normal. Taking oral iron for years, some nausea/constipation. Periods are monthly and light, last three days Denies other bleeding, GI workup 15ish years ago. Had iv iron infusions in the past 2020/2009.         History of Present Illness:   34 y/o female with a long h/o iron deficiency anemia. This has been attributed to her periods and possible absorption issues. She has takes iron pills (ferrous sulfate) for years with continued anemia. Was seen by hematology in the past- around 2009 with Dr. Cruz ([facility]- received venofer x 6) and in 2020 with Dr. Urrutia ([facility]- received feraheme x 2).     She reports some nausea and constipation on oral iron but puts up with it and for the past year takes this 2x/day.     Had a colonoscopy and EGD about 15 years ago.     Periods are montly, regular and not particularly heavy she says for the past 7 months. LMP about 2 weeks ago. She does not donate blood. She is not a vegetarian. No personal or family h/o hemoglobinopathies.    Denies SOB, chest or abdominal pain, change in urinary habits, headaches, dizziness or visual symptoms. She reports fatigue. No pica.     Most recent CBC from 1/25/25: 7.2>11.2/36.7<460. MCV 77.9, MCH 23.8, ferritin 11.     She was referred for possible IV iron    Past Medical History:  Past Medical History[1]    Past Surgical History:  Past Surgical History[2]    Family Medical History:  Family History[3]    Gyne History:  OB  History    Para Term  AB Living   0 0 0 0 0 0   SAB IAB Ectopic Multiple Live Births   0 0 0 0 0       Psychosocial History:  Social History     Socioeconomic History    Marital status: Single     Spouse name: Not on file    Number of children: Not on file    Years of education: Not on file    Highest education level: Not on file   Occupational History    Not on file   Tobacco Use    Smoking status: Never    Smokeless tobacco: Never   Vaping Use    Vaping status: Never Used   Substance and Sexual Activity    Alcohol use: Yes     Alcohol/week: 3.0 standard drinks of alcohol     Types: 3 Standard drinks or equivalent per week     Comment: 4 drinks/month    Drug use: No    Sexual activity: Never     Partners: Male   Other Topics Concern    Caffeine Concern No    Exercise No    Seat Belt No    Special Diet Yes    Stress Concern No    Weight Concern Yes   Social History Narrative    Not on file     Social Drivers of Health     Food Insecurity: No Food Insecurity (2025)    NCSS - Food Insecurity     Worried About Running Out of Food in the Last Year: No     Ran Out of Food in the Last Year: No   Transportation Needs: No Transportation Needs (2025)    NCSS - Transportation     Lack of Transportation: No   Housing Stability: Not At Risk (2025)    NCSS - Housing/Utilities     Has Housing: Yes     Worried About Losing Housing: No     Unable to Get Utilities: No       Allergies:   Allergies[4]    Current Medications:  Medications - Current[5]    Review of Systems:  A 14-point ROS was done with pertinent positives and negative per the HPI    Vital Signs:  /86 (BP Location: Left arm, Patient Position: Sitting, Cuff Size: large)   Pulse 107   Temp 97.7 °F (36.5 °C) (Temporal)   Resp 16   Ht 1.549 m (5' 1\")   Wt 100.4 kg (221 lb 6.4 oz)   LMP 2025 (Approximate)   SpO2 99%   BMI 41.83 kg/m²     Physical Examination:  General: Patient is alert and oriented x 3, not in acute  distress.  HEENT: EOMs intact. PERRL. Oropharynx is clear.   Neck: No JVD. No palpable lymphadenopathy. Neck is supple.  Chest: Clear to auscultation.  Heart: Regular rate and rhythm.   Abdomen: Soft, non tender with good bowel sounds.  Extremities: Pedal pulses are present. No edema.  Neurological: Grossly intact.   Lymphatics: There is no palpable lymphadenopathy throughout in the cervical, supraclavicular, axillary, or inguinal regions.  Psych/Depression: Mood and affect are appropriate.    Laboratory:  Lab Component   Component Value Date/Time    RBC 4.71 01/25/2025 0832    RBC 4.55 04/26/2024 0752    RBC 4.85 10/14/2023 0851    HGB 11.2 (L) 01/25/2025 0832    HGB 10.5 (L) 04/26/2024 0752    HGB 11.6 (L) 10/14/2023 0851    HCT 36.7 01/25/2025 0832    HCT 35.7 04/26/2024 0752    HCT 37.4 10/14/2023 0851    MCV 77.9 (L) 01/25/2025 0832    MCV 78.5 (L) 04/26/2024 0752    MCV 77.1 (L) 10/14/2023 0851    MCH 23.8 (L) 01/25/2025 0832    MCH 23.1 (L) 04/26/2024 0752    MCH 23.9 (L) 10/14/2023 0851    MCHC 30.5 (L) 01/25/2025 0832    MCHC 29.4 (L) 04/26/2024 0752    MCHC 31.0 10/14/2023 0851    RDW 17.8 01/25/2025 0832    RDW 16.5 04/26/2024 0752    RDW 16.2 10/14/2023 0851    Neutrophil Absolute Prelim 3.69 01/25/2025 0832    Neutrophil Absolute Prelim 5.57 04/26/2024 0752    Neutrophil Absolute Prelim 5.06 10/14/2023 0851    WBC 7.2 01/25/2025 0832    WBC 9.5 04/26/2024 0752    WBC 8.6 10/14/2023 0851    .0 (H) 01/25/2025 0832    .0 04/26/2024 0752    .0 (H) 10/14/2023 0851      Latest Reference Range & Units 01/25/25 08:32   FERRITIN 50 - 306 ng/mL 11 (L)   (L): Data is abnormally low     Latest Reference Range & Units 01/25/25 08:32   T4,Free (Direct) 0.8 - 1.7 ng/dL 1.9 (H)   TSH 0.550 - 4.780 uIU/mL 0.306 (L)   (H): Data is abnormally high  (L): Data is abnormally low     Latest Reference Range & Units 02/04/22 16:32 02/04/22 16:33 04/08/22 13:03   FERRITIN 12.0 - 160.0 ng/mL 11.4 (L)  15.1    Vitamin B12 193 - 986 pg/mL 486     FOLATE (FOLIC ACID), SERUM >=8.7 ng/mL  9.6 12.0   (L): Data is abnormally low    Lab Component   Component Value Date/Time    Glucose 90 01/25/2025 0832    Glucose 116 (H) 04/26/2024 0752    Glucose 95 10/14/2023 0851    BUN 8 (L) 01/25/2025 0832    BUN 10 04/26/2024 0752    BUN 9 10/14/2023 0851    Creatinine 0.95 01/25/2025 0832    Creatinine 1.32 (H) 04/26/2024 0752    Creatinine 1.30 (H) 10/14/2023 0851    GFR, -American 61 02/04/2022 1632    GFR, -American 85 01/09/2020 0507    GFR, -American 75 01/08/2020 0907    GFR, Non- 53 (L) 02/04/2022 1632    GFR, Non- 73 01/09/2020 0507    GFR, Non- 65 01/08/2020 0907    Calcium, Total 9.5 01/25/2025 0832    Calcium, Total 9.1 04/26/2024 0752    Calcium, Total 9.6 10/14/2023 0851    Albumin 2.5 (L) 01/09/2020 0507    Albumin 2.9 (L) 01/08/2020 0907    Albumin 3.6 01/07/2020 1831    Sodium 139 01/25/2025 0832    Sodium 138 04/26/2024 0752    Sodium 137 10/14/2023 0851    Potassium 4.4 01/25/2025 0832    Potassium 4.8 04/26/2024 0752    Potassium 4.8 10/14/2023 0851    Chloride 105 01/25/2025 0832    Chloride 108 04/26/2024 0752    Chloride 107 10/14/2023 0851    CO2 24.0 01/25/2025 0832    CO2 26.0 04/26/2024 0752    CO2 26.0 10/14/2023 0851    Alkaline Phosphatase 137 (H) 01/09/2020 0507    Alkaline Phosphatase 164 (H) 01/08/2020 0907    Alkaline Phosphatase 201 (H) 01/07/2020 1831    AST 18 01/25/2025 0832    AST 18 04/26/2024 0752    AST 18 10/14/2023 0851    ALT 13 01/25/2025 0832    ALT 27 04/26/2024 0752    ALT 30 10/14/2023 0851    Bilirubin, Total 0.6 01/09/2020 0507    Bilirubin, Total 1.5 01/08/2020 0907    Bilirubin, Total 3.3 (H) 01/07/2020 1831    Total Protein 6.4 01/09/2020 0507    Total Protein 6.8 01/08/2020 0907    Total Protein 9.0 (H) 01/07/2020 1831     Labs today  Dextran  4w eeks          Radiology:  PROCEDURE:  US ABDOMEN COMPLETE  (CPT=76700)     COMPARISON:  None.     INDICATIONS:  upper abd pain     TECHNIQUE:  Real time gray-scale ultrasound was used to evaluate the abdomen.  The exam includes images of the liver, gallbladder, common bile duct, pancreas, spleen, kidneys, IVC, and aorta.     PATIENT STATED HISTORY: (As transcribed by Technologist)  Patient complaint of RUQ pain for 2 days.         FINDINGS:    LIVER:  Normal size and heterogeneous echogenicity. No significant masses.  BILIARY:  Normal appearing intrahepatic ducts, and common bile duct.  Common bile duct diameter is 11 mm.  Gallstones in the gallbladder are noted.  PANCREAS:  Normal.  SPLEEN:  Normal.  KIDNEYS:  Normal.  Right kidney measures 10.9 cm.  Left kidney measures 11.3 cm.  AORTA/IVC:  Normal.      Impression   CONCLUSION:       1. Cholelithiasis without evidence of acute cholecystitis.     2. Dilated common bile duct is noted.  Possibility of an obstructing lesion or calculus cannot be excluded.  MRCP may be done for further evaluation if needed.           Dictated by: Alf Anand MD on 1/07/2020 at 17:23      Approved by: Alf Anand MD on 1/07/2020 at 17:26           Impression & Plan:   1. Iron deficiency anemia  - attributed to periods and malabsorption  - previous GI w/u negative  - on oral iron with continued KELLI, no increment  - discussed drawing baseline labs today and proceeding with IV iron. Rationale for this, how given and potential side effects discussed. She verbalized understanding and would like to proceed  - she can continue to take oral iron but recommended she take only daily or every other day.  The paradigm for oral iron repletion has evolved, as evidence has begun to emerge suggesting that excessive dosing is potentially counterproductive, decreasing iron absorption and increasing side effects without improving iron stores or anemia.     2. Thrombocytosis  - likely from KELLI  - should normalized with normalization of KELLI  - if not  will w/u for possible MPD    3. Hypothyroidism  - most recent levels off and LT4 dose recently adjusted by PCP  - may be contributing to fatigue    Labs reviewed with her and her mom  Baseline labs today  IV dextran  Labs 4 weeks from infusion    I spent 50 minutes face to face with the patient.  More than 50% of that time was spent counseling the patient and/or on coordination of care.        Janeth Haddad MD  [facility]         [1]   Past Medical History:   Allergic rhinitis    Anemia    Asthma (HCC)    Diabetes (HCC)    Hypothyroidism    Obesity   [2]   Past Surgical History:  Procedure Laterality Date    Cholecystectomy  01/08/2020    Colonoscopy  2012    wnl   [3]   Family History  Problem Relation Age of Onset    Hypertension Mother     Diabetes Mother     High Cholesterol Father     Breast Cancer Maternal Grandmother     Cancer Maternal Grandmother         Breast cancer    Diabetes Maternal Grandfather     Cancer Maternal Grandfather         Breast cancer    Diabetes Paternal Grandmother    [4]   Allergies  Allergen Reactions    No Known Allergies    [5]   Current Outpatient Medications:     SYNTHROID 200 MCG Oral Tab, 250 mcg Friday-Sunday, 275 mcg Monday-Thursday effective 1/25/2025., Disp: 30 tablet, Rfl: 3    WIXELA INHUB 500-50 MCG/ACT Inhalation Aerosol Powder, Breath Activated, Inhale 1 puff into the lungs 2 (two) times daily., Disp: 180 each, Rfl: 3    rosuvastatin 5 MG Oral Tab, Take 1 tablet (5 mg total) by mouth daily., Disp: 90 tablet, Rfl: 1    Phentermine HCl 37.5 MG Oral Cap, Take 1 capsule (37.5 mg total) by mouth every morning., Disp: 90 capsule, Rfl: 3    montelukast (SINGULAIR) 10 MG Oral Tab, Take 1 tablet (10 mg total) by mouth daily., Disp: 90 tablet, Rfl: 3    albuterol 108 (90 Base) MCG/ACT Inhalation Aero Soln, Inhale 2 puffs into the lungs every 4 (four) hours as needed for Wheezing or Shortness of Breath., Disp: 6.7 g, Rfl: 0    ergocalciferol 1.25 MG (80126  UT) Oral Cap, Take 1 capsule (50,000 Units total) by mouth every 7 days., Disp: 12 capsule, Rfl: 3    SYNTHROID 75 MCG Oral Tab, Total daily dose of 275 mcg Monday through Thursday, effective 1/25/2025, Disp: , Rfl:     folic acid 800 MCG Oral Tab, Take 1 tablet (800 mcg total) by mouth in the morning., Disp: , Rfl:     cyanocobalamine 1000 MCG Oral Tab, Take 1 tablet (1,000 mcg total) by mouth in the morning., Disp: , Rfl:     Insulin Pen Needle (PEN NEEDLES) 32G X 4 MM Does not apply Misc, 1 each every 7 days., Disp: 30 each, Rfl: 0    IRON, FERROUS SULFATE, OR, Take 1 tablet by mouth in the morning and 1 tablet before bedtime. One daily., Disp: , Rfl:     fluticasone propionate 50 MCG/ACT Nasal Suspension, 1 spray by Nasal route in the morning., Disp: , Rfl:     albuterol sulfate (2.5 MG/3ML) 0.083% Inhalation Nebu Soln, Take 3 mL (2.5 mg total) by nebulization every 4 (four) hours as needed for Wheezing or Shortness of Breath., Disp: 1 Box, Rfl: 0

## 2025-04-14 NOTE — TELEPHONE ENCOUNTER
Pharmacy requesting refill for Synthroid for 0.05MG 50MCG. I did not see that in the chart, is there a change to the patients dose?

## 2025-04-14 NOTE — TELEPHONE ENCOUNTER
Patient's current synthroid dose is as follows. So I am happy to refill the 50 mcg tablet but I would like her to do a TSH now. She has several labs ordered in the system.    250 mcg Friday-Sunday, 275 mcg Monday-Thursday effective 1/25/2025.

## 2025-04-14 NOTE — PATIENT INSTRUCTIONS
For triage nurse: [phone] Monday through Friday 7:30-5:00.  *Please note this is a new phone number*    After hours or weekends for emergent needs:  [phone].     To schedule diagnostic testing: Central [phone]    For Medical Records: [phone]

## 2025-04-15 RX ORDER — LEVOTHYROXINE SODIUM 50 MCG
50 TABLET ORAL
Qty: 90 TABLET | Refills: 1 | Status: SHIPPED | OUTPATIENT
Start: 2025-04-15

## 2025-04-21 ENCOUNTER — OFFICE VISIT (OUTPATIENT)
Age: 35
End: 2025-04-21
Attending: INTERNAL MEDICINE
Payer: COMMERCIAL

## 2025-04-21 VITALS
RESPIRATION RATE: 16 BRPM | TEMPERATURE: 97 F | HEIGHT: 60.98 IN | DIASTOLIC BLOOD PRESSURE: 84 MMHG | OXYGEN SATURATION: 99 % | SYSTOLIC BLOOD PRESSURE: 124 MMHG | HEART RATE: 105 BPM | BODY MASS INDEX: 42.17 KG/M2 | WEIGHT: 223.38 LBS

## 2025-04-21 DIAGNOSIS — D50.0 IRON DEFICIENCY ANEMIA DUE TO CHRONIC BLOOD LOSS: Primary | ICD-10-CM

## 2025-04-21 NOTE — PROGRESS NOTES
Education Record    Learner:  Patient    Disease / Diagnosis: KELLI    Barriers / Limitations:  None   Comments:    Method:  Brief focused and Reinforcement   Comments:    General Topics:  Plan of care reviewed   Comments:    Outcome:  Shows understanding   Comments:    Patient tolerated Infed and discharged in stable condition.

## 2025-05-09 ENCOUNTER — PATIENT MESSAGE (OUTPATIENT)
Dept: INTERNAL MEDICINE CLINIC | Facility: CLINIC | Age: 35
End: 2025-05-09

## 2025-05-09 ENCOUNTER — LAB ENCOUNTER (OUTPATIENT)
Dept: LAB | Age: 35
End: 2025-05-09
Attending: INTERNAL MEDICINE
Payer: COMMERCIAL

## 2025-05-09 DIAGNOSIS — E78.5 HYPERLIPIDEMIA ASSOCIATED WITH TYPE 2 DIABETES MELLITUS (HCC): ICD-10-CM

## 2025-05-09 DIAGNOSIS — E11.9 TYPE 2 DIABETES MELLITUS WITHOUT COMPLICATION, WITHOUT LONG-TERM CURRENT USE OF INSULIN (HCC): ICD-10-CM

## 2025-05-09 DIAGNOSIS — E06.3 HASHIMOTO'S THYROIDITIS: ICD-10-CM

## 2025-05-09 DIAGNOSIS — D50.0 IRON DEFICIENCY ANEMIA DUE TO CHRONIC BLOOD LOSS: ICD-10-CM

## 2025-05-09 DIAGNOSIS — E11.69 HYPERLIPIDEMIA ASSOCIATED WITH TYPE 2 DIABETES MELLITUS (HCC): ICD-10-CM

## 2025-05-09 LAB
ALT SERPL-CCNC: 56 U/L (ref 10–49)
AST SERPL-CCNC: 43 U/L (ref ?–34)
CHOLEST SERPL-MCNC: 155 MG/DL (ref ?–200)
EST. AVERAGE GLUCOSE BLD GHB EST-MCNC: 120 MG/DL (ref 68–126)
FASTING PATIENT LIPID ANSWER: YES
HBA1C MFR BLD: 5.8 % (ref ?–5.7)
HDLC SERPL-MCNC: 57 MG/DL (ref 40–59)
LDLC SERPL CALC-MCNC: 84 MG/DL (ref ?–100)
NONHDLC SERPL-MCNC: 98 MG/DL (ref ?–130)
T4 FREE SERPL-MCNC: 2 NG/DL (ref 0.8–1.7)
TRIGL SERPL-MCNC: 71 MG/DL (ref 30–149)
TSI SER-ACNC: 0.03 UIU/ML (ref 0.55–4.78)
VLDLC SERPL CALC-MCNC: 11 MG/DL (ref 0–30)

## 2025-05-09 PROCEDURE — 84443 ASSAY THYROID STIM HORMONE: CPT

## 2025-05-09 PROCEDURE — 84450 TRANSFERASE (AST) (SGOT): CPT

## 2025-05-09 PROCEDURE — 84460 ALANINE AMINO (ALT) (SGPT): CPT

## 2025-05-09 PROCEDURE — 84439 ASSAY OF FREE THYROXINE: CPT

## 2025-05-09 PROCEDURE — 36415 COLL VENOUS BLD VENIPUNCTURE: CPT

## 2025-05-09 PROCEDURE — 83036 HEMOGLOBIN GLYCOSYLATED A1C: CPT

## 2025-05-09 PROCEDURE — 80061 LIPID PANEL: CPT

## 2025-06-17 ENCOUNTER — LAB ENCOUNTER (OUTPATIENT)
Dept: LAB | Age: 35
End: 2025-06-17
Attending: INTERNAL MEDICINE
Payer: COMMERCIAL

## 2025-06-17 DIAGNOSIS — D50.0 IRON DEFICIENCY ANEMIA DUE TO CHRONIC BLOOD LOSS: ICD-10-CM

## 2025-06-17 DIAGNOSIS — E11.69 HYPERLIPIDEMIA ASSOCIATED WITH TYPE 2 DIABETES MELLITUS (HCC): ICD-10-CM

## 2025-06-17 DIAGNOSIS — K90.89 POOR IRON ABSORPTION (HCC): ICD-10-CM

## 2025-06-17 DIAGNOSIS — E11.9 TYPE 2 DIABETES MELLITUS WITHOUT COMPLICATION, WITHOUT LONG-TERM CURRENT USE OF INSULIN (HCC): ICD-10-CM

## 2025-06-17 DIAGNOSIS — E78.5 HYPERLIPIDEMIA ASSOCIATED WITH TYPE 2 DIABETES MELLITUS (HCC): ICD-10-CM

## 2025-06-17 DIAGNOSIS — D75.839 THROMBOCYTOSIS: ICD-10-CM

## 2025-06-17 LAB
ALBUMIN SERPL-MCNC: 4.4 G/DL (ref 3.2–4.8)
ALP LIVER SERPL-CCNC: 47 U/L (ref 37–98)
ALT SERPL-CCNC: 27 U/L (ref 10–49)
AST SERPL-CCNC: 23 U/L (ref ?–34)
BASOPHILS # BLD AUTO: 0.08 X10(3) UL (ref 0–0.2)
BASOPHILS NFR BLD AUTO: 0.8 %
BILIRUB DIRECT SERPL-MCNC: <0.1 MG/DL (ref ?–0.3)
BILIRUB SERPL-MCNC: 0.3 MG/DL (ref 0.3–1.2)
DEPRECATED HBV CORE AB SER IA-ACNC: 107 NG/ML (ref 50–306)
EOSINOPHIL # BLD AUTO: 0.14 X10(3) UL (ref 0–0.7)
EOSINOPHIL NFR BLD AUTO: 1.5 %
ERYTHROCYTE [DISTWIDTH] IN BLOOD BY AUTOMATED COUNT: 18.3 %
HCT VFR BLD AUTO: 38.8 % (ref 35–48)
HGB BLD-MCNC: 11.6 G/DL (ref 12–16)
IMM GRANULOCYTES # BLD AUTO: 0.01 X10(3) UL (ref 0–1)
IMM GRANULOCYTES NFR BLD: 0.1 %
IRON SATN MFR SERPL: 16 % (ref 15–50)
IRON SERPL-MCNC: 48 UG/DL (ref 50–170)
LYMPHOCYTES # BLD AUTO: 2.58 X10(3) UL (ref 1–4)
LYMPHOCYTES NFR BLD AUTO: 27.3 %
MCH RBC QN AUTO: 24.6 PG (ref 26–34)
MCHC RBC AUTO-ENTMCNC: 29.9 G/DL (ref 31–37)
MCV RBC AUTO: 82.2 FL (ref 80–100)
MONOCYTES # BLD AUTO: 0.72 X10(3) UL (ref 0.1–1)
MONOCYTES NFR BLD AUTO: 7.6 %
NEUTROPHILS # BLD AUTO: 5.93 X10 (3) UL (ref 1.5–7.7)
NEUTROPHILS # BLD AUTO: 5.93 X10(3) UL (ref 1.5–7.7)
NEUTROPHILS NFR BLD AUTO: 62.7 %
PLATELET # BLD AUTO: 418 10(3)UL (ref 150–450)
PROT SERPL-MCNC: 7.7 G/DL (ref 5.7–8.2)
RBC # BLD AUTO: 4.72 X10(6)UL (ref 3.8–5.3)
T4 FREE SERPL-MCNC: 2.1 NG/DL (ref 0.8–1.7)
TOTAL IRON BINDING CAPACITY: 292 UG/DL (ref 250–425)
TRANSFERRIN SERPL-MCNC: 225 MG/DL (ref 250–380)
TSI SER-ACNC: 0.01 UIU/ML (ref 0.55–4.78)
WBC # BLD AUTO: 9.5 X10(3) UL (ref 4–11)

## 2025-06-17 PROCEDURE — 83550 IRON BINDING TEST: CPT

## 2025-06-17 PROCEDURE — 36415 COLL VENOUS BLD VENIPUNCTURE: CPT

## 2025-06-17 PROCEDURE — 84443 ASSAY THYROID STIM HORMONE: CPT

## 2025-06-17 PROCEDURE — 85025 COMPLETE CBC W/AUTO DIFF WBC: CPT

## 2025-06-17 PROCEDURE — 83540 ASSAY OF IRON: CPT

## 2025-06-17 PROCEDURE — 84439 ASSAY OF FREE THYROXINE: CPT

## 2025-06-17 PROCEDURE — 80076 HEPATIC FUNCTION PANEL: CPT

## 2025-06-17 PROCEDURE — 82728 ASSAY OF FERRITIN: CPT

## 2025-06-20 ENCOUNTER — OFFICE VISIT (OUTPATIENT)
Dept: INTERNAL MEDICINE CLINIC | Facility: CLINIC | Age: 35
End: 2025-06-20
Payer: COMMERCIAL

## 2025-06-20 VITALS
BODY MASS INDEX: 42.48 KG/M2 | SYSTOLIC BLOOD PRESSURE: 120 MMHG | TEMPERATURE: 97 F | HEART RATE: 105 BPM | HEIGHT: 60.98 IN | DIASTOLIC BLOOD PRESSURE: 86 MMHG | OXYGEN SATURATION: 100 % | WEIGHT: 225 LBS

## 2025-06-20 DIAGNOSIS — E06.3 HASHIMOTO'S THYROIDITIS: ICD-10-CM

## 2025-06-20 DIAGNOSIS — Z00.00 ROUTINE GENERAL MEDICAL EXAMINATION AT A HEALTH CARE FACILITY: Primary | ICD-10-CM

## 2025-06-20 DIAGNOSIS — E66.9 TYPE 2 DIABETES MELLITUS WITH OBESITY (HCC): ICD-10-CM

## 2025-06-20 DIAGNOSIS — Z12.4 SCREENING FOR CERVICAL CANCER: ICD-10-CM

## 2025-06-20 DIAGNOSIS — E11.69 TYPE 2 DIABETES MELLITUS WITH OBESITY (HCC): ICD-10-CM

## 2025-06-20 DIAGNOSIS — E11.9 TYPE 2 DIABETES MELLITUS WITHOUT COMPLICATION, WITHOUT LONG-TERM CURRENT USE OF INSULIN (HCC): ICD-10-CM

## 2025-06-20 DIAGNOSIS — J45.20 MILD INTERMITTENT ASTHMA WITHOUT COMPLICATION (HCC): ICD-10-CM

## 2025-06-20 DIAGNOSIS — D50.0 IRON DEFICIENCY ANEMIA DUE TO CHRONIC BLOOD LOSS: ICD-10-CM

## 2025-06-20 PROCEDURE — 87624 HPV HI-RISK TYP POOLED RSLT: CPT | Performed by: INTERNAL MEDICINE

## 2025-06-20 PROCEDURE — 99395 PREV VISIT EST AGE 18-39: CPT | Performed by: INTERNAL MEDICINE

## 2025-06-20 PROCEDURE — 88175 CYTOPATH C/V AUTO FLUID REDO: CPT | Performed by: INTERNAL MEDICINE

## 2025-06-20 RX ORDER — BUPROPION HYDROCHLORIDE 150 MG/1
150 TABLET ORAL DAILY
Qty: 30 TABLET | Refills: 3 | Status: SHIPPED | OUTPATIENT
Start: 2025-06-20

## 2025-06-20 RX ORDER — ALBUTEROL SULFATE 90 UG/1
2 INHALANT RESPIRATORY (INHALATION) EVERY 4 HOURS PRN
Qty: 6.7 G | Refills: 3 | Status: SHIPPED | OUTPATIENT
Start: 2025-06-20

## 2025-06-20 NOTE — PATIENT INSTRUCTIONS
Please add wellbutrin 150 mg daily for one month and then we can increase to 300 -450 mg daily depending on how you are feeling/doing on it.

## 2025-06-20 NOTE — PROGRESS NOTES
HPI:   Patient presents for the following issues and physical exam   Weight management - she is gaining weight. She is still practicing healthy lifestyle. She wants to talk about medications. She is having a bit more cravings but does not give in. Still feels hungry when waking up and after eating proper portions. She has never used topamax or jardiance. She was on wellbutrin for 1 year in past which did help with appetite suppression and snacking and it helped her lose 12 lbs.   DM - well controlled.   Cervical cancer screening - pap smear due.   Hypothyroidism - not taking anything with biotin in it. We will reduce synthroid further to 225 mcg every day as of today. She is taking synthroid properly.   REYNA - she is not sure she needs it anymore because she is sleeping much better since losing weight.   Iron deficiency - IV iron has helped her energy level.   Asthma - she has been feeling more chest tightness. She has needed albuterol 2 times this past week. She wonders if she needs to see an allergist. Exercise is not aggravating her. Being outside triggers her.       REVIEW OF SYSTEMS:   GENERAL HEALTH: feels well otherwise. No f/c  NEURO: denies any headaches, LH, dizzyness, LOC, falls  VISION: denies any blurred or double vision  RESPIRATORY: see HPI  CARDIOVASCULAR: denies chest pain, pressure or palpitations  GI: denies abdominal pain, diarrhea, n/v, BRBPR, melena. She has occ constipation.   : no dysuria or hematuria. Has monthly menses, last 4 days, no longer heavy.   SKIN: denies any unusual skin lesions or rashes  PSYCH: mood is stable. Denies SI/HI. She is participating in counseling.   EXT: occ feet swelling     Wt Readings from Last 6 Encounters:   04/21/25 223 lb 6.4 oz (101.3 kg)   04/14/25 221 lb 6.4 oz (100.4 kg)   01/31/25 211 lb 3.2 oz (95.8 kg)   05/01/24 206 lb 12.8 oz (93.8 kg)   10/17/23 205 lb 3.2 oz (93.1 kg)   08/16/23 206 lb 6.4 oz (93.6 kg)       Allergies   Allergen Reactions    No  Known Allergies        Family History   Problem Relation Age of Onset    Hypertension Mother     Diabetes Mother     High Cholesterol Father     Breast Cancer Maternal Grandmother     Cancer Maternal Grandmother         Breast cancer    Diabetes Maternal Grandfather     Cancer Maternal Grandfather         Breast cancer    Diabetes Paternal Grandmother       Past Medical History:    Allergic rhinitis    Anemia    Asthma (HCC)    Diabetes (HCC)    Hypothyroidism    Obesity      Past Surgical History:   Procedure Laterality Date    Cholecystectomy  01/08/2020    Colonoscopy  2012    wnl      Social History:    Social History     Socioeconomic History    Marital status: Single   Tobacco Use    Smoking status: Never    Smokeless tobacco: Never   Vaping Use    Vaping status: Never Used   Substance and Sexual Activity    Alcohol use: Yes     Alcohol/week: 3.0 standard drinks of alcohol     Types: 3 Standard drinks or equivalent per week     Comment: 4 drinks/month    Drug use: No    Sexual activity: Never     Partners: Male   Other Topics Concern    Caffeine Concern No    Exercise No    Seat Belt No    Special Diet Yes    Stress Concern No    Weight Concern Yes     Social Drivers of Health     Food Insecurity: No Food Insecurity (1/31/2025)    NCSS - Food Insecurity     Worried About Running Out of Food in the Last Year: No     Ran Out of Food in the Last Year: No   Transportation Needs: No Transportation Needs (1/31/2025)    NCSS - Transportation     Lack of Transportation: No   Housing Stability: Not At Risk (1/31/2025)    NCSS - Housing/Utilities     Has Housing: Yes     Worried About Losing Housing: No     Unable to Get Utilities: No           EXAM:   /86 (BP Location: Left arm, Patient Position: Sitting, Cuff Size: large)   Pulse 105   Temp 97.2 °F (36.2 °C) (Temporal)   Ht 5' 0.98\" (1.549 m)   Wt 225 lb (102.1 kg)   LMP 06/07/2025 (Approximate)   SpO2 100%   BMI 42.54 kg/m²   GENERAL: A&O well developed,  well nourished,in no apparent distress  SKIN: no rashes,no suspicious lesions  HEENT: atraumatic, MMM, throat is clear  NECK: supple, no jvd, no thyromegaly  LUNGS: clear to auscultation bilateraly, no c/w/r  CARDIO: RRR without g/m/r  GI: softly distended, obese, non-tender, no HSM, normal bS throughotu  NEURO: CN 2-12 grossly intact  PSYCH: pleasant  EXTREMITIES: no cyanosis, clubbing or edema  Pelvic: has a cervical polyp, otherwise normal  Breast: no masses, tendernress, nipple discharge, or axillary lad b/l  Chaperone present for breast and pelvic exam       ASSESSMENT AND PLAN:   # REYNA - she had very mild sleep apnea and her sleep quality has improved with lifestyle changes so she is going to stop wearing cpap and re-assess her symptoms  # Hashimoto's Hypothyroidism -  TSH continues to be suppressed despite decreasing her synthroid dose. Decrease Synthroid to 225 mcg every day and repeat TSH in 6 weeks.   - she takes synthroid appropriately, not on biotin  -  She has been on multiple different thyroid replacements because they were not absorbing correctly. Celiac testing was negative in past.   # Diabetes: At goal in May, 2025 off ozempic. Can use jardiance in future if indicated.   - did not tolerate metformin.   - DM eye exam 4/2025 with Dr. Yen negative for retinopathy in either eye.   - Foot Exam: done 2025   - LDL: see below  - BP: controlled without medication  - micro alb:creat < 30 in 2025  - Depression Screen: done 2025  - not on ASA  # Iron Deficiency Anemia - received IV iron 4/2025. Ferritin was low despite oral iron replacement.   # Obesity, BMI 42 in DM - continue behavioral measures. Resume wellbutrin, cont phentermine  - starting weight 247 lbs in 2/2022.   - Continue healthy plant based nutrition, regular exercise, and practicing mindfulness.   - Took contrave for 1 year and it did not help. Ozepmic (0.25 mg to 1 mg dose made her too nauseaus), did not tolerate metformin.   # Folic Acid  and B12 Deficiency: cont daily supplement  # Vitamin D Deficiency: continue weekly replacement.   # HLP assoc with DM - start rosuvastatin. She is not planning on pregnancy.   # Asthma - ACT done in 2025  # Health Maintenance: CPX June, 2025   Colon Cancer Screening: no first deg relatives. Screen at age 45  Cervical Cancer Screening: completed today   Breast Cancer Screening: no first deg relative. Discuss mammography at age 40.   Bone Health/Fall Prevention (Gonzalo Chi): educated on dietary calcium/vit D3, weight bearing exercises and fall prevention  Vaccines: TDAP 2023. Cont flu and COVID.   Hep C screening: Ab neg in 2022      The patient indicates understanding of these issues and agrees to the plan.  The patient is asked to return in 6 months for chronic health issues.   Lola Jc MD

## 2025-06-27 LAB — HPV E6+E7 MRNA CVX QL NAA+PROBE: NEGATIVE

## 2025-07-22 ENCOUNTER — PATIENT MESSAGE (OUTPATIENT)
Dept: INTERNAL MEDICINE CLINIC | Facility: CLINIC | Age: 35
End: 2025-07-22

## 2025-07-22 RX ORDER — PHENTERMINE HYDROCHLORIDE 37.5 MG/1
37.5 CAPSULE ORAL EVERY MORNING
Qty: 90 CAPSULE | Refills: 0 | OUTPATIENT
Start: 2025-07-22

## 2025-07-22 NOTE — TELEPHONE ENCOUNTER
Requesting: Phentermine HCl 37.5    Protocol: Failed  Last Office Visit: 6/20/25  Labs: Completed in June  Last Refill: 1/6/25 #90 3 Refills   Return to Clinic: No future appointments.    Requested medication Refill too soon

## 2025-07-28 NOTE — TELEPHONE ENCOUNTER
KS: Please see MCM  Patient has been on 150 mg of Wellbutrin.     Patients phentermine is no longer covered by insurance. Please advise. Ty

## 2025-07-30 RX ORDER — PHENTERMINE HYDROCHLORIDE 37.5 MG/1
37.5 CAPSULE ORAL EVERY MORNING
Qty: 90 CAPSULE | Refills: 3 | Status: SHIPPED | OUTPATIENT
Start: 2025-07-30

## 2025-08-23 ENCOUNTER — LAB ENCOUNTER (OUTPATIENT)
Dept: LAB | Age: 35
End: 2025-08-23

## 2025-08-23 ENCOUNTER — LAB ENCOUNTER (OUTPATIENT)
Dept: LAB | Age: 35
End: 2025-08-23
Attending: INTERNAL MEDICINE

## 2025-08-23 DIAGNOSIS — D50.0 IRON DEFICIENCY ANEMIA DUE TO CHRONIC BLOOD LOSS: ICD-10-CM

## 2025-08-23 DIAGNOSIS — E78.5 HYPERLIPIDEMIA ASSOCIATED WITH TYPE 2 DIABETES MELLITUS (HCC): ICD-10-CM

## 2025-08-23 DIAGNOSIS — D75.839 THROMBOCYTOSIS: ICD-10-CM

## 2025-08-23 DIAGNOSIS — K90.89 POOR IRON ABSORPTION (HCC): ICD-10-CM

## 2025-08-23 DIAGNOSIS — E06.3 HASHIMOTO'S THYROIDITIS: ICD-10-CM

## 2025-08-23 DIAGNOSIS — E11.9 TYPE 2 DIABETES MELLITUS WITHOUT COMPLICATION, WITHOUT LONG-TERM CURRENT USE OF INSULIN (HCC): ICD-10-CM

## 2025-08-23 DIAGNOSIS — E11.69 HYPERLIPIDEMIA ASSOCIATED WITH TYPE 2 DIABETES MELLITUS (HCC): ICD-10-CM

## 2025-08-23 LAB
BASOPHILS # BLD AUTO: 0.09 X10(3) UL (ref 0–0.2)
BASOPHILS NFR BLD AUTO: 1 %
DEPRECATED HBV CORE AB SER IA-ACNC: 70 NG/ML (ref 50–306)
EOSINOPHIL # BLD AUTO: 0.16 X10(3) UL (ref 0–0.7)
EOSINOPHIL NFR BLD AUTO: 1.8 %
ERYTHROCYTE [DISTWIDTH] IN BLOOD BY AUTOMATED COUNT: 14.3 %
HCT VFR BLD AUTO: 40.4 % (ref 35–48)
HGB BLD-MCNC: 12.4 G/DL (ref 12–16)
IMM GRANULOCYTES # BLD AUTO: 0.03 X10(3) UL (ref 0–1)
IMM GRANULOCYTES NFR BLD: 0.3 %
IRON SATN MFR SERPL: 14 % (ref 15–50)
IRON SERPL-MCNC: 44 UG/DL (ref 50–170)
LYMPHOCYTES # BLD AUTO: 1.84 X10(3) UL (ref 1–4)
LYMPHOCYTES NFR BLD AUTO: 20.7 %
MCH RBC QN AUTO: 25.2 PG (ref 26–34)
MCHC RBC AUTO-ENTMCNC: 30.7 G/DL (ref 31–37)
MCV RBC AUTO: 81.9 FL (ref 80–100)
MONOCYTES # BLD AUTO: 0.66 X10(3) UL (ref 0.1–1)
MONOCYTES NFR BLD AUTO: 7.4 %
NEUTROPHILS # BLD AUTO: 6.1 X10 (3) UL (ref 1.5–7.7)
NEUTROPHILS # BLD AUTO: 6.1 X10(3) UL (ref 1.5–7.7)
NEUTROPHILS NFR BLD AUTO: 68.8 %
PLATELET # BLD AUTO: 402 10(3)UL (ref 150–450)
RBC # BLD AUTO: 4.93 X10(6)UL (ref 3.8–5.3)
T4 FREE SERPL-MCNC: 1.7 NG/DL (ref 0.8–1.7)
TOTAL IRON BINDING CAPACITY: 319 UG/DL (ref 250–425)
TRANSFERRIN SERPL-MCNC: 262 MG/DL (ref 250–380)
TSI SER-ACNC: 0.12 UIU/ML (ref 0.55–4.78)
WBC # BLD AUTO: 8.9 X10(3) UL (ref 4–11)

## 2025-08-23 PROCEDURE — 84450 TRANSFERASE (AST) (SGOT): CPT

## 2025-08-23 PROCEDURE — 84439 ASSAY OF FREE THYROXINE: CPT

## 2025-08-23 PROCEDURE — 85025 COMPLETE CBC W/AUTO DIFF WBC: CPT

## 2025-08-23 PROCEDURE — 82728 ASSAY OF FERRITIN: CPT

## 2025-08-23 PROCEDURE — 83550 IRON BINDING TEST: CPT

## 2025-08-23 PROCEDURE — 80048 BASIC METABOLIC PNL TOTAL CA: CPT

## 2025-08-23 PROCEDURE — 36415 COLL VENOUS BLD VENIPUNCTURE: CPT

## 2025-08-23 PROCEDURE — 84443 ASSAY THYROID STIM HORMONE: CPT

## 2025-08-23 PROCEDURE — 84460 ALANINE AMINO (ALT) (SGPT): CPT

## 2025-08-23 PROCEDURE — 83540 ASSAY OF IRON: CPT

## 2025-08-25 LAB
ALT SERPL-CCNC: 24 U/L (ref 10–49)
ANION GAP SERPL CALC-SCNC: 14 MMOL/L (ref 0–18)
AST SERPL-CCNC: 18 U/L (ref ?–34)
BUN BLD-MCNC: 7 MG/DL (ref 9–23)
CALCIUM BLD-MCNC: 10.1 MG/DL (ref 8.7–10.6)
CHLORIDE SERPL-SCNC: 103 MMOL/L (ref 98–112)
CO2 SERPL-SCNC: 22 MMOL/L (ref 21–32)
CREAT BLD-MCNC: 1.01 MG/DL (ref 0.55–1.02)
EGFRCR SERPLBLD CKD-EPI 2021: 74 ML/MIN/1.73M2 (ref 60–?)
GLUCOSE BLD-MCNC: 111 MG/DL (ref 70–99)
OSMOLALITY SERPL CALC.SUM OF ELEC: 287 MOSM/KG (ref 275–295)
POTASSIUM SERPL-SCNC: 4.8 MMOL/L (ref 3.5–5.1)
SODIUM SERPL-SCNC: 139 MMOL/L (ref 136–145)

## (undated) DIAGNOSIS — E78.5 HYPERLIPIDEMIA ASSOCIATED WITH TYPE 2 DIABETES MELLITUS (HCC): ICD-10-CM

## (undated) DIAGNOSIS — E06.3 HASHIMOTO'S THYROIDITIS: ICD-10-CM

## (undated) DIAGNOSIS — D50.0 IRON DEFICIENCY ANEMIA DUE TO CHRONIC BLOOD LOSS: ICD-10-CM

## (undated) DIAGNOSIS — E06.3 HASHIMOTO'S THYROIDITIS: Primary | ICD-10-CM

## (undated) DIAGNOSIS — E55.9 VITAMIN D DEFICIENCY: ICD-10-CM

## (undated) DIAGNOSIS — E11.9 TYPE 2 DIABETES MELLITUS WITHOUT COMPLICATION, WITHOUT LONG-TERM CURRENT USE OF INSULIN (HCC): ICD-10-CM

## (undated) DIAGNOSIS — E11.69 HYPERLIPIDEMIA ASSOCIATED WITH TYPE 2 DIABETES MELLITUS (HCC): ICD-10-CM

## (undated) DIAGNOSIS — E11.9 TYPE 2 DIABETES MELLITUS WITHOUT COMPLICATION, WITHOUT LONG-TERM CURRENT USE OF INSULIN (HCC): Primary | ICD-10-CM

## (undated) DEVICE — PDS II VLT 0 107CM AG ST3: Brand: ENDOLOOP

## (undated) DEVICE — KENDALL SCD EXPRESS SLEEVES, KNEE LENGTH, MEDIUM: Brand: KENDALL SCD

## (undated) DEVICE — VIOLET BRAIDED (POLYGLACTIN 910), SYNTHETIC ABSORBABLE SUTURE: Brand: COATED VICRYL

## (undated) DEVICE — TROCAR: Brand: KII® SLEEVE

## (undated) DEVICE — REM POLYHESIVE ADULT PATIENT RETURN ELECTRODE: Brand: VALLEYLAB

## (undated) DEVICE — CHLORAPREP 26ML APPLICATOR

## (undated) DEVICE — TROCAR: Brand: KII SHIELDED BLADED ACCESS SYSTEM

## (undated) DEVICE — TIGERTAIL 5F FLXTIP 70CM

## (undated) DEVICE — NITINOL WIRE STR 035

## (undated) DEVICE — DISPOSABLE LAPAROSCOPIC CLIP APPLIER WITH 20 CLIPS.: Brand: EPIX® UNIVERSAL CLIP APPLIER

## (undated) DEVICE — SUTURE MONOCRYL 4-0 PS-2

## (undated) DEVICE — LAP CHOLE/APPY CDS-LF: Brand: MEDLINE INDUSTRIES, INC.

## (undated) DEVICE — DRAPE C-ARM UNIVERSAL

## (undated) DEVICE — LIGACLIP EXTRA LIGATING CLIP CARTRIDGES: 6 TITANIUM CLIPS/ CARTRIDGE (LARGE): Brand: LIGACLIP

## (undated) DEVICE — TROCARS: Brand: KII® BALLOON BLUNT TIP SYSTEM

## (undated) DEVICE — GAMMEX® NON-LATEX PI TEXTURED SIZE 7.5, STERILE POLYISOPRENE POWDER-FREE SURGICAL GLOVE: Brand: GAMMEX

## (undated) DEVICE — CAUTERY PENCIL

## (undated) DEVICE — Device

## (undated) NOTE — Clinical Note
Ramos Ch saw you in February for REYNA and was advised to start nasal cpap but has not heard anything for f/u from the office. Just wanted to see if I could help patient close the loop. Regards!  Jorge L Alejandro

## (undated) NOTE — LETTER
Ofe Slaughter [address]  [place], [address] [place] [address]    Consent for Operation  Date: __________________                                Time: _______________    1.  I authorize the performance upon Sandy William the following operation:  Procedur procedure has been videotaped, the surgeon will obtain the original videotape. The hospital will not be responsible for storage or maintenance of this tape.   8. For the purpose of advancing medical education, I consent to the admittance of observers to the STATEMENTS REQUIRING INSERTION OR COMPLETION WERE FILLED IN.     Signature of Patient:   ___________________________    When the patient is a minor or mentally incompetent to give consent:  Signature of person authorized to consent for patient: ____________ supplements, and pills I can buy without a prescription (including street drugs/illegal medications). Failure to inform my anesthesiologist about these medicines may increase my risk of anesthetic complications. iv.  If I am allergic to anything or have ha Anesthesiologist Signature     Date   Time  I have discussed the procedure and information above with the patient (or patient’s representative) and answered their questions. The patient or their representative has agreed to have anesthesia services.     ___

## (undated) NOTE — LETTER
ASTHMA ACTION PLAN for Rolf Mabry     : 1990     Date: 2022  Provider:  Julian Layton MD  Phone for doctor or clinic: [address]  [address]  [place] [phone]    ACT Score: 22      You can use the colors of a traffic light to help learn about your asthma medicines. 1. Green - Go! % of Personal Best Peak Flow Use controller medicine. Breathing is good  No cough or wheeze  Can work and play Medicine How much to take When to take it    Symbiccort inhaler 160/4.5, 2 puffs twice a day         2. Yellow - Caution. 50-79% Personal Best Peak  Flow. Use reliever medicine to keep an asthma attack from getting bad. Cough  Wheezing  Tight Chest  Wake up at night Medicine How much to take When to take it    Albuterol 2.5 mg nebules, use in nebulizer every 4 to 6 hours as needed         Additional instructions         3. Red - Stop! Danger!  <50% Personal Best Peak  Flow. Take these medications until  Get help from a doctor   Medicine not helping  Breathing is hard and fast  Nose opens wide  Can't walk  Ribs show  Can't talk well Medicine How much to take When to take it    No medications needed because of good control. Additional Instructions If your symptoms do not improve and you cannot contact your doctor, [facility] or call 911 immediately! [x] Asthma Action Plan reviewed with patient (and caregiver if necessary) and a copy of the plan was given to the patient/caregiver. [] Asthma Action Plan reviewed with patient (and caregiver if necessary) on the phone and mailed copy to patient or submitted via [address]      Signatures:  Provider  Julian Layton MD   Patient Caretaker

## (undated) NOTE — LETTER
ASTHMA ACTION PLAN for Rolf Mabry     : 1990     Date: 2023  Provider:  Julian Layton MD  Phone for doctor or clinic: [facility] [facility] [address]  [place] [phone]    ACT Score: 22      You can use the colors of a traffic light to help learn about your asthma medicines. 1. Green - Go! % of Personal Best Peak Flow Use controller medicine. Breathing is good  No cough or wheeze  Can work and play Medicine How much to take When to take it    Budesonide-Formoterol Fumarate 160-4.5 MCG/ACT Inhalation Aerosol    Inhale 2 puffs into the lungs 2 (two) times daily      2. Yellow - Caution. 50-79% Personal Best Peak  Flow. Use reliever medicine to keep an asthma attack from getting bad. Cough  Wheezing  Tight Chest  Wake up at night Medicine How much to take When to take it    albuterol 108 (90 Base) MCG/ACT Inhalation Aero Soln     Inhale 2 puffs into the lungs every 4 (four) hours as needed for Wheezing or Shortness of Breath  albuterol sulfate (2.5 MG/3ML) 0.083% Inhalation Nebu Soln   Take 3 mL (2.5 mg total) by nebulization every 4 (four) hours as needed for Wheezing or Shortness of Breath       Additional instructions         3. Red - Stop! Danger!  <50% Personal Best Peak  Flow. Take these medications until  Get help from a doctor   Medicine not helping  Breathing is hard and fast  Nose opens wide  Can't walk  Ribs show  Can't talk well Medicine How much to take When to take it    CALL 911! GO TO THE EMERGENCY ROOM      Additional Instructions If your symptoms do not improve and you cannot contact your doctor, [facility] or call 911 immediately! [x] Asthma Action Plan reviewed with patient (and caregiver if necessary) and a copy of the plan was given to the patient/caregiver.    [] Asthma Action Plan reviewed with patient (and caregiver if necessary) on the phone and mailed copy to patient or submitted via [employer].      Signatures:  Provider  Elma Khan MD   Patient Caretaker

## (undated) NOTE — LETTER
Ofe Slaughter [address]  [place], [address] [place] [address]    Consent for Operation  Date: __________________                                Time: _______________    1.  I authorize the performance upon Mumtaz Trinidad the following operation:  Procedur procedure has been videotaped, the surgeon will obtain the original videotape. The hospital will not be responsible for storage or maintenance of this tape.   7. For the purpose of advancing medical education, I consent to the admittance of observers to the STATEMENTS REQUIRING INSERTION OR COMPLETION WERE FILLED IN.     Signature of Patient:   ___________________________    When the patient is a minor or mentally incompetent to give consent:  Signature of person authorized to consent for patient: ____________ supplements, and pills I can buy without a prescription (including street drugs/illegal medications). Failure to inform my anesthesiologist about these medicines may increase my risk of anesthetic complications. iv.  If I am allergic to anything or have ha Anesthesiologist Signature     Date   Time  I have discussed the procedure and information above with the patient (or patient’s representative) and answered their questions. The patient or their representative has agreed to have anesthesia services.     ___

## (undated) NOTE — LETTER
ASTHMA ACTION PLAN for Zuly Starr     : 1990     Date: 2024  Provider:  Lola Jc MD  Phone for doctor or clinic: [facility], [facility]  [address] [facility] [address]  [facility] [phone]    ACT Score: 16      You can use the colors of a traffic light to help learn about your asthma medicines.      1. Green - Go! % of Personal Best Peak Flow Use controller medicine.   Breathing is good  No cough or wheeze  Can work and play Medicine How much to take When to take it    montelukast (SINGULAIR) 10 MG Oral Tab 90 tablet 3 2024 --    Sig - Route: Take 1 tablet (10 mg total) by mouth daily. - Oral          2. Yellow - Caution. 50-79% Personal Best Peak  Flow.  Use reliever medicine to keep an asthma attack from getting bad.   Cough  Wheezing  Tight Chest  Wake up at night Medicine How much to take When to take it    albuterol 108 (90 Base) MCG/ACT Inhalation Aero Soln 6.7 g 0 11/3/2023 --   Sig:   Inhale 2 puffs into the lungs every 4 (four) hours as needed for Wheezing or Shortness of Breath.     Route:   Inhalation     PRN Reason(s):   Wheezing, Shortness of Breath            Additional instructions         3. Red - Stop! Danger!  <50% Personal Best Peak  Flow. Take these medications until  Get help from a doctor   Medicine not helping  Breathing is hard and fast  Nose opens wide  Can't walk  Ribs show  Can't talk well Medicine How much to take When to take it    Call 911!     Additional Instructions If your symptoms do not improve and you cannot contact your doctor, [facility] or call 911 immediately!     [x] Asthma Action Plan reviewed with patient (and caregiver if necessary) and a copy of the plan was given to the patient/caregiver.   [] Asthma Action Plan reviewed with patient (and caregiver if necessary) on the phone and mailed copy to patient or submitted via [employer].     Signatures:  Provider  Lola Jc MD   Patient  Caretaker

## (undated) NOTE — LETTER
Ofe Slaughter [address] 6 [address]  [place], [address] [place] [address]    Consent for Operation  Date: __________________                                Time: _______________    1.  I authorize the performance upon Baudilio Parker the following operation:  Procedur procedure has been videotaped, the surgeon will obtain the original videotape. The hospital will not be responsible for storage or maintenance of this tape.   7. For the purpose of advancing medical education, I consent to the admittance of observers to the STATEMENTS REQUIRING INSERTION OR COMPLETION WERE FILLED IN.     Signature of Patient:   ___________________________    When the patient is a minor or mentally incompetent to give consent:  Signature of person authorized to consent for patient: ____________ supplements, and pills I can buy without a prescription (including street drugs/illegal medications). Failure to inform my anesthesiologist about these medicines may increase my risk of anesthetic complications. iv.  If I am allergic to anything or have ha Anesthesiologist Signature     Date   Time  I have discussed the procedure and information above with the patient (or patient’s representative) and answered their questions. The patient or their representative has agreed to have anesthesia services.     ___

## (undated) NOTE — LETTER
ASTHMA ACTION PLAN for Talbot Romberg     : 1990     Date: 2022  Provider:  Jt Feliz MD  Phone for doctor or clinic: [address]  [address]  [place] [phone]    ACT Score: 23      You can use the colors of a traffic light to help learn about your asthma medicines. 1. Green - Go! % of Personal Best Peak Flow Use controller medicine. Breathing is good  No cough or wheeze  Can work and play Medicine How much to take When to take it    Symbiccort inhaler 160/4.5, 2 puffs twice a day           2. Yellow - Caution. 50-79% Personal Best Peak  Flow. Use reliever medicine to keep an asthma attack from getting bad. Cough  Wheezing  Tight Chest  Wake up at night Medicine How much to take When to take it    Albuterol inhaler,  2 puffs every four hours as needed. Albuterol 2.5 mg nebules, use in nebulizer every 4 as needed         Additional instructions         3. Red - Stop! Danger!  <50% Personal Best Peak  Flow. Take these medications until  Get help from a doctor   Medicine not helping  Breathing is hard and fast  Nose opens wide  Can't walk  Ribs show  Can't talk well Medicine How much to take When to take it    Go to the nearest Emergency Room/Department right now! Additional Instructions If your symptoms do not improve and you cannot contact your doctor, [facility] or call 911 immediately! [x] Asthma Action Plan reviewed with patient (and caregiver if necessary) and a copy of the plan was given to the patient/caregiver. [] Asthma Action Plan reviewed with patient (and caregiver if necessary) on the phone and mailed copy to patient or submitted via [address]      Signatures: Dr Jt Feliz   Provider  Jt Feliz MD   Patient Caretaker

## (undated) NOTE — LETTER
Ofe Slaughter [address]  [place], [address] [facility]    Consent for Operation  Date: __________________                                Time: _______________    1.  I authorize the performance upon Frances Olvera the following operation:  Procedur procedure has been videotaped, the surgeon will obtain the original videotape. The hospital will not be responsible for storage or maintenance of this tape.   7. For the purpose of advancing medical education, I consent to the admittance of observers to the STATEMENTS REQUIRING INSERTION OR COMPLETION WERE FILLED IN.     Signature of Patient:   ___________________________    When the patient is a minor or mentally incompetent to give consent:  Signature of person authorized to consent for patient: ____________ supplements, and pills I can buy without a prescription (including street drugs/illegal medications). Failure to inform my anesthesiologist about these medicines may increase my risk of anesthetic complications. iv.  If I am allergic to anything or have ha Anesthesiologist Signature     Date   Time  I have discussed the procedure and information above with the patient (or patient’s representative) and answered their questions. The patient or their representative has agreed to have anesthesia services.     ___

## (undated) NOTE — LETTER
24  RE: Zuyl Fofanaam  : 1990  Summons date (April 15, 2024)   Participant number ([id number]).     To Whom It May Concern,    Zuly and her mother are both under my medical care. 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 Thus she should be excused from jury duty to continue to provide 24 hour care to her mother.    Kind Regards,    Dr. Lola Jc